# Patient Record
Sex: MALE | Race: WHITE | URBAN - NONMETROPOLITAN AREA
[De-identification: names, ages, dates, MRNs, and addresses within clinical notes are randomized per-mention and may not be internally consistent; named-entity substitution may affect disease eponyms.]

---

## 2017-07-18 ENCOUNTER — DOCTOR'S OFFICE (OUTPATIENT)
Dept: URBAN - NONMETROPOLITAN AREA CLINIC 1 | Facility: CLINIC | Age: 67
Setting detail: OPHTHALMOLOGY
End: 2017-07-18
Payer: COMMERCIAL

## 2017-07-18 DIAGNOSIS — H18.51: ICD-10-CM

## 2017-07-18 DIAGNOSIS — H25.013: ICD-10-CM

## 2017-07-18 PROCEDURE — 92014 COMPRE OPH EXAM EST PT 1/>: CPT | Performed by: OPHTHALMOLOGY

## 2017-07-18 ASSESSMENT — REFRACTION_MANIFEST
OU_VA: 20/
OS_VA2: 20/
OD_VA1: 20/
OS_VA1: 20/
OU_VA: 20/
OD_VA1: 20/
OS_VA1: 20/
OS_VA2: 20/
OS_VA3: 20/
OD_VA3: 20/
OS_VA3: 20/
OS_VA1: 20/
OD_VA2: 20/
OD_VA2: 20/
OD_VA1: 20/
OD_VA3: 20/
OS_VA2: 20/
OD_VA3: 20/
OS_VA3: 20/
OD_VA2: 20/
OU_VA: 20/

## 2017-07-18 ASSESSMENT — REFRACTION_AUTOREFRACTION
OS_CYLINDER: -0.50
OD_CYLINDER: -0.25
OD_SPHERE: +0.25
OS_AXIS: 179
OD_AXIS: 103
OS_SPHERE: +0.75

## 2017-07-18 ASSESSMENT — CORNEAL DYSTROPHY - POSTERIOR
OS_POSTERIORDYSTROPHY: GUTTATA
OD_POSTERIORDYSTROPHY: GUTTATA

## 2017-07-18 ASSESSMENT — REFRACTION_CURRENTRX
OS_OVR_VA: 20/
OD_OVR_VA: 20/
OS_OVR_VA: 20/
OD_OVR_VA: 20/
OD_OVR_VA: 20/
OS_OVR_VA: 20/

## 2017-07-18 ASSESSMENT — SPHEQUIV_DERIVED
OS_SPHEQUIV: 0.5
OD_SPHEQUIV: 0.125

## 2017-07-18 ASSESSMENT — CONFRONTATIONAL VISUAL FIELD TEST (CVF)
OS_FINDINGS: FULL
OD_FINDINGS: FULL

## 2017-07-18 ASSESSMENT — VISUAL ACUITY
OS_BCVA: 20/25
OD_BCVA: 20/40+2

## 2018-07-25 ENCOUNTER — DOCTOR'S OFFICE (OUTPATIENT)
Dept: URBAN - NONMETROPOLITAN AREA CLINIC 1 | Facility: CLINIC | Age: 68
Setting detail: OPHTHALMOLOGY
End: 2018-07-25
Payer: COMMERCIAL

## 2018-07-25 DIAGNOSIS — H25.013: ICD-10-CM

## 2018-07-25 DIAGNOSIS — H18.51: ICD-10-CM

## 2018-07-25 DIAGNOSIS — H43.391: ICD-10-CM

## 2018-07-25 PROCEDURE — 92014 COMPRE OPH EXAM EST PT 1/>: CPT | Performed by: OPHTHALMOLOGY

## 2018-07-25 ASSESSMENT — REFRACTION_MANIFEST
OS_VA1: 20/
OS_VA2: 20/
OS_VA2: 20/
OU_VA: 20/
OD_VA1: 20/
OD_VA1: 20/
OS_VA2: 20/
OS_VA1: 20/
OS_VA1: 20/
OD_VA2: 20/
OD_VA2: 20/
OS_VA3: 20/
OD_VA3: 20/
OS_VA3: 20/
OD_VA1: 20/
OD_VA3: 20/
OU_VA: 20/
OU_VA: 20/
OS_VA3: 20/
OD_VA2: 20/
OD_VA3: 20/

## 2018-07-25 ASSESSMENT — CORNEAL DYSTROPHY - POSTERIOR
OD_POSTERIORDYSTROPHY: GUTTATA
OS_POSTERIORDYSTROPHY: GUTTATA

## 2018-07-25 ASSESSMENT — PACHYMETRY
OS_CT_CORRECTION: 4
OD_CT_CORRECTION: 5
OS_CT_UM: 495
OD_CT_UM: 479

## 2018-07-25 ASSESSMENT — SPHEQUIV_DERIVED
OS_SPHEQUIV: 0.875
OD_SPHEQUIV: -0.25

## 2018-07-25 ASSESSMENT — REFRACTION_CURRENTRX
OD_OVR_VA: 20/
OS_OVR_VA: 20/
OS_OVR_VA: 20/
OD_OVR_VA: 20/
OS_OVR_VA: 20/
OD_OVR_VA: 20/

## 2018-07-25 ASSESSMENT — REFRACTION_AUTOREFRACTION
OD_CYLINDER: -0.50
OD_AXIS: 21
OD_SPHERE: 0.00
OS_SPHERE: +1.25
OS_CYLINDER: -0.75
OS_AXIS: 4

## 2018-07-25 ASSESSMENT — VISUAL ACUITY
OS_BCVA: 20/25-1
OD_BCVA: 20/40

## 2018-07-25 ASSESSMENT — CONFRONTATIONAL VISUAL FIELD TEST (CVF)
OD_FINDINGS: FULL
OS_FINDINGS: FULL

## 2019-07-31 ENCOUNTER — DOCTOR'S OFFICE (OUTPATIENT)
Dept: URBAN - NONMETROPOLITAN AREA CLINIC 1 | Facility: CLINIC | Age: 69
Setting detail: OPHTHALMOLOGY
End: 2019-07-31
Payer: COMMERCIAL

## 2019-07-31 DIAGNOSIS — H43.811: ICD-10-CM

## 2019-07-31 DIAGNOSIS — H25.013: ICD-10-CM

## 2019-07-31 DIAGNOSIS — H18.51: ICD-10-CM

## 2019-07-31 PROCEDURE — 92014 COMPRE OPH EXAM EST PT 1/>: CPT | Performed by: OPHTHALMOLOGY

## 2019-07-31 PROCEDURE — 92134 CPTRZ OPH DX IMG PST SGM RTA: CPT | Performed by: OPHTHALMOLOGY

## 2019-07-31 PROCEDURE — 92286 ANT SGM IMG I&R SPECLR MIC: CPT | Performed by: OPHTHALMOLOGY

## 2019-07-31 ASSESSMENT — REFRACTION_CURRENTRX
OD_OVR_VA: 20/
OS_OVR_VA: 20/
OS_OVR_VA: 20/
OS_SPHERE: +2.00
OD_SPHERE: +2.00
OD_OVR_VA: 20/
OS_OVR_VA: 20/
OD_OVR_VA: 20/

## 2019-07-31 ASSESSMENT — REFRACTION_MANIFEST
OS_VA2: 20/
OD_VA2: 20/
OS_VA2: 20/
OD_VA1: 20/
OU_VA: 20/
OD_VA1: 20/
OD_VA3: 20/
OD_VA2: 20/
OS_VA3: 20/
OD_VA3: 20/
OU_VA: 20/
OS_VA1: 20/
OS_VA1: 20/
OS_VA3: 20/

## 2019-07-31 ASSESSMENT — REFRACTION_AUTOREFRACTION
OS_AXIS: 1
OD_AXIS: 084
OD_SPHERE: +1.25
OD_CYLINDER: -0.50
OS_SPHERE: +1.25
OS_CYLINDER: -0.50

## 2019-07-31 ASSESSMENT — CONFRONTATIONAL VISUAL FIELD TEST (CVF)
OD_FINDINGS: FULL
OS_FINDINGS: FULL

## 2019-07-31 ASSESSMENT — SPHEQUIV_DERIVED
OS_SPHEQUIV: 1
OD_SPHEQUIV: 1

## 2019-07-31 ASSESSMENT — CORNEAL DYSTROPHY - POSTERIOR
OD_POSTERIORDYSTROPHY: GUTTATA
OS_POSTERIORDYSTROPHY: GUTTATA

## 2019-07-31 ASSESSMENT — VISUAL ACUITY
OD_BCVA: 20/40+1
OS_BCVA: 20/25

## 2020-07-29 ENCOUNTER — DOCTOR'S OFFICE (OUTPATIENT)
Dept: URBAN - NONMETROPOLITAN AREA CLINIC 1 | Facility: CLINIC | Age: 70
Setting detail: OPHTHALMOLOGY
End: 2020-07-29
Payer: COMMERCIAL

## 2020-07-29 VITALS — HEIGHT: 60 IN

## 2020-07-29 DIAGNOSIS — H43.811: ICD-10-CM

## 2020-07-29 DIAGNOSIS — C85.80: ICD-10-CM

## 2020-07-29 DIAGNOSIS — H18.51: ICD-10-CM

## 2020-07-29 DIAGNOSIS — H25.013: ICD-10-CM

## 2020-07-29 PROCEDURE — 92286 ANT SGM IMG I&R SPECLR MIC: CPT | Performed by: OPHTHALMOLOGY

## 2020-07-29 PROCEDURE — 92014 COMPRE OPH EXAM EST PT 1/>: CPT | Performed by: OPHTHALMOLOGY

## 2020-07-29 ASSESSMENT — REFRACTION_AUTOREFRACTION
OS_AXIS: 128
OD_CYLINDER: -1.00
OD_AXIS: 083
OS_CYLINDER: -0.50
OD_SPHERE: +0.75
OS_SPHERE: +1.25

## 2020-07-29 ASSESSMENT — VISUAL ACUITY
OD_BCVA: 20/40-1
OS_BCVA: 20/25+2

## 2020-07-29 ASSESSMENT — CONFRONTATIONAL VISUAL FIELD TEST (CVF)
OD_FINDINGS: FULL
OS_FINDINGS: FULL

## 2020-07-29 ASSESSMENT — REFRACTION_CURRENTRX
OS_OVR_VA: 20/
OD_SPHERE: +2.00
OD_OVR_VA: 20/
OS_SPHERE: +2.00

## 2020-07-29 ASSESSMENT — CORNEAL DYSTROPHY - POSTERIOR
OD_POSTERIORDYSTROPHY: GUTTATA
OS_POSTERIORDYSTROPHY: GUTTATA

## 2020-07-29 ASSESSMENT — SPHEQUIV_DERIVED
OS_SPHEQUIV: 1
OD_SPHEQUIV: 0.25

## 2021-08-03 ENCOUNTER — DOCTOR'S OFFICE (OUTPATIENT)
Dept: URBAN - NONMETROPOLITAN AREA CLINIC 1 | Facility: CLINIC | Age: 71
Setting detail: OPHTHALMOLOGY
End: 2021-08-03
Payer: COMMERCIAL

## 2021-08-03 VITALS — HEIGHT: 60 IN

## 2021-08-03 DIAGNOSIS — H43.811: ICD-10-CM

## 2021-08-03 DIAGNOSIS — C85.80: ICD-10-CM

## 2021-08-03 DIAGNOSIS — H18.513: ICD-10-CM

## 2021-08-03 DIAGNOSIS — H25.013: ICD-10-CM

## 2021-08-03 PROCEDURE — 92286 ANT SGM IMG I&R SPECLR MIC: CPT | Performed by: OPHTHALMOLOGY

## 2021-08-03 PROCEDURE — 92014 COMPRE OPH EXAM EST PT 1/>: CPT | Performed by: OPHTHALMOLOGY

## 2021-08-03 ASSESSMENT — REFRACTION_CURRENTRX
OD_SPHERE: +2.00
OS_SPHERE: +2.00
OD_OVR_VA: 20/
OS_OVR_VA: 20/

## 2021-08-03 ASSESSMENT — VISUAL ACUITY
OD_BCVA: 20/40+1
OS_BCVA: 20/20-1

## 2021-08-03 ASSESSMENT — REFRACTION_AUTOREFRACTION
OD_AXIS: 105
OS_CYLINDER: -0.25
OS_AXIS: 158
OD_SPHERE: +1.00
OS_SPHERE: +1.75
OD_CYLINDER: -0.75

## 2021-08-03 ASSESSMENT — SPHEQUIV_DERIVED
OS_SPHEQUIV: 1.625
OD_SPHEQUIV: 0.625

## 2021-08-03 ASSESSMENT — CONFRONTATIONAL VISUAL FIELD TEST (CVF)
OD_FINDINGS: FULL
OS_FINDINGS: FULL

## 2021-08-03 ASSESSMENT — CORNEAL DYSTROPHY - POSTERIOR
OD_POSTERIORDYSTROPHY: GUTTATA
OS_POSTERIORDYSTROPHY: GUTTATA

## 2022-04-19 ENCOUNTER — DOCTOR'S OFFICE (OUTPATIENT)
Dept: URBAN - NONMETROPOLITAN AREA CLINIC 1 | Facility: CLINIC | Age: 72
Setting detail: OPHTHALMOLOGY
End: 2022-04-19
Payer: COMMERCIAL

## 2022-04-19 DIAGNOSIS — H18.513: ICD-10-CM

## 2022-04-19 DIAGNOSIS — H25.043: ICD-10-CM

## 2022-04-19 DIAGNOSIS — H35.373: ICD-10-CM

## 2022-04-19 DIAGNOSIS — H43.811: ICD-10-CM

## 2022-04-19 DIAGNOSIS — H25.013: ICD-10-CM

## 2022-04-19 PROCEDURE — 76514 ECHO EXAM OF EYE THICKNESS: CPT | Performed by: OPHTHALMOLOGY

## 2022-04-19 PROCEDURE — 99214 OFFICE O/P EST MOD 30 MIN: CPT | Performed by: OPHTHALMOLOGY

## 2022-04-19 PROCEDURE — 92134 CPTRZ OPH DX IMG PST SGM RTA: CPT | Performed by: OPHTHALMOLOGY

## 2022-04-19 ASSESSMENT — PACHYMETRY
OD_CT_CORRECTION: 5
OD_CT_UM: 470
OS_CT_UM: 439
OS_CT_CORRECTION: >7

## 2022-04-19 ASSESSMENT — REFRACTION_CURRENTRX
OS_SPHERE: +2.00
OD_SPHERE: +2.00
OS_OVR_VA: 20/
OD_OVR_VA: 20/

## 2022-04-19 ASSESSMENT — VISUAL ACUITY
OD_BCVA: 20/40+2
OS_BCVA: 20/25+2

## 2022-04-19 ASSESSMENT — REFRACTION_AUTOREFRACTION
OD_SPHERE: +1.25
OS_SPHERE: +1.75
OD_CYLINDER: -1.00
OD_AXIS: 147
OS_AXIS: 075
OS_CYLINDER: -1.00

## 2022-04-19 ASSESSMENT — CORNEAL DYSTROPHY - POSTERIOR
OD_POSTERIORDYSTROPHY: GUTTATA
OS_POSTERIORDYSTROPHY: GUTTATA

## 2022-04-19 ASSESSMENT — SPHEQUIV_DERIVED
OS_SPHEQUIV: 1.25
OD_SPHEQUIV: 0.75

## 2022-04-19 ASSESSMENT — CONFRONTATIONAL VISUAL FIELD TEST (CVF)
OD_FINDINGS: FULL
OS_FINDINGS: FULL

## 2022-07-20 ENCOUNTER — DOCTOR'S OFFICE (OUTPATIENT)
Dept: URBAN - NONMETROPOLITAN AREA CLINIC 1 | Facility: CLINIC | Age: 72
Setting detail: OPHTHALMOLOGY
End: 2022-07-20
Payer: COMMERCIAL

## 2022-07-20 ENCOUNTER — RX ONLY (RX ONLY)
Age: 72
End: 2022-07-20

## 2022-07-20 DIAGNOSIS — H43.811: ICD-10-CM

## 2022-07-20 DIAGNOSIS — H16.221: ICD-10-CM

## 2022-07-20 DIAGNOSIS — H35.373: ICD-10-CM

## 2022-07-20 DIAGNOSIS — H25.013: ICD-10-CM

## 2022-07-20 DIAGNOSIS — H25.043: ICD-10-CM

## 2022-07-20 DIAGNOSIS — H16.222: ICD-10-CM

## 2022-07-20 PROBLEM — H18.513 ENDOTHELIAL CORNEAL DYSTROPHY; BOTH EYES: Status: ACTIVE | Noted: 2021-08-03

## 2022-07-20 PROBLEM — C85.80 NON HODGKINS LYMPHOMA: Status: ACTIVE | Noted: 2020-07-29

## 2022-07-20 PROCEDURE — 92134 CPTRZ OPH DX IMG PST SGM RTA: CPT | Performed by: OPHTHALMOLOGY

## 2022-07-20 PROCEDURE — 99214 OFFICE O/P EST MOD 30 MIN: CPT | Performed by: OPHTHALMOLOGY

## 2022-07-20 ASSESSMENT — REFRACTION_AUTOREFRACTION
OS_AXIS: 050
OD_AXIS: 094
OD_SPHERE: +1.75
OD_CYLINDER: -1.00
OS_CYLINDER: -0.50
OS_SPHERE: +2.25

## 2022-07-20 ASSESSMENT — REFRACTION_CURRENTRX
OD_OVR_VA: 20/
OS_OVR_VA: 20/
OS_SPHERE: +2.00
OD_SPHERE: +2.00

## 2022-07-20 ASSESSMENT — VISUAL ACUITY
OS_BCVA: 20/25+1
OD_BCVA: 20/60+2

## 2022-07-20 ASSESSMENT — CORNEAL DYSTROPHY - POSTERIOR
OS_POSTERIORDYSTROPHY: GUTTATA
OD_POSTERIORDYSTROPHY: GUTTATA

## 2022-07-20 ASSESSMENT — DRY EYES - PHYSICIAN NOTES
OS_GENERALCOMMENTS: MILD KSICCA
OD_GENERALCOMMENTS: MILD KSICCA

## 2022-07-20 ASSESSMENT — CONFRONTATIONAL VISUAL FIELD TEST (CVF)
OD_FINDINGS: FULL
OS_FINDINGS: FULL

## 2022-07-20 ASSESSMENT — SPHEQUIV_DERIVED
OS_SPHEQUIV: 2
OD_SPHEQUIV: 1.25

## 2022-12-14 ENCOUNTER — DOCTOR'S OFFICE (OUTPATIENT)
Dept: URBAN - NONMETROPOLITAN AREA CLINIC 1 | Facility: CLINIC | Age: 72
Setting detail: OPHTHALMOLOGY
End: 2022-12-14
Payer: COMMERCIAL

## 2022-12-14 ENCOUNTER — RX ONLY (RX ONLY)
Age: 72
End: 2022-12-14

## 2022-12-14 VITALS — HEIGHT: 60 IN

## 2022-12-14 DIAGNOSIS — H35.373: ICD-10-CM

## 2022-12-14 DIAGNOSIS — H25.013: ICD-10-CM

## 2022-12-14 DIAGNOSIS — H43.811: ICD-10-CM

## 2022-12-14 DIAGNOSIS — H25.043: ICD-10-CM

## 2022-12-14 DIAGNOSIS — H16.221: ICD-10-CM

## 2022-12-14 DIAGNOSIS — H16.222: ICD-10-CM

## 2022-12-14 PROCEDURE — 92134 CPTRZ OPH DX IMG PST SGM RTA: CPT | Performed by: OPHTHALMOLOGY

## 2022-12-14 PROCEDURE — 92014 COMPRE OPH EXAM EST PT 1/>: CPT | Performed by: OPHTHALMOLOGY

## 2022-12-14 ASSESSMENT — CORNEAL DYSTROPHY - POSTERIOR
OS_POSTERIORDYSTROPHY: GUTTATA
OD_POSTERIORDYSTROPHY: GUTTATA

## 2022-12-14 ASSESSMENT — REFRACTION_AUTOREFRACTION
OS_SPHERE: +2.75
OS_AXIS: 92
OS_CYLINDER: -1.25

## 2022-12-14 ASSESSMENT — REFRACTION_CURRENTRX
OS_OVR_VA: 20/
OS_SPHERE: +2.00
OD_SPHERE: +2.00
OD_OVR_VA: 20/

## 2022-12-14 ASSESSMENT — SPHEQUIV_DERIVED: OS_SPHEQUIV: 2.125

## 2022-12-14 ASSESSMENT — DRY EYES - PHYSICIAN NOTES
OD_GENERALCOMMENTS: MILD KSICCA
OS_GENERALCOMMENTS: MILD KSICCA

## 2022-12-14 ASSESSMENT — VISUAL ACUITY
OD_BCVA: 20/40-1
OS_BCVA: 20/30-2

## 2022-12-14 ASSESSMENT — CONFRONTATIONAL VISUAL FIELD TEST (CVF)
OS_FINDINGS: FULL
OD_FINDINGS: FULL

## 2023-04-19 ENCOUNTER — DOCTOR'S OFFICE (OUTPATIENT)
Dept: URBAN - NONMETROPOLITAN AREA CLINIC 1 | Facility: CLINIC | Age: 73
Setting detail: OPHTHALMOLOGY
End: 2023-04-19
Payer: COMMERCIAL

## 2023-04-19 DIAGNOSIS — H43.811: ICD-10-CM

## 2023-04-19 DIAGNOSIS — H25.043: ICD-10-CM

## 2023-04-19 DIAGNOSIS — H16.223: ICD-10-CM

## 2023-04-19 DIAGNOSIS — H35.3131: ICD-10-CM

## 2023-04-19 DIAGNOSIS — H25.013: ICD-10-CM

## 2023-04-19 PROCEDURE — 99213 OFFICE O/P EST LOW 20 MIN: CPT | Performed by: OPHTHALMOLOGY

## 2023-04-19 PROCEDURE — 92134 CPTRZ OPH DX IMG PST SGM RTA: CPT | Performed by: OPHTHALMOLOGY

## 2023-04-19 ASSESSMENT — REFRACTION_AUTOREFRACTION
OS_AXIS: 92
OS_SPHERE: +2.75
OS_CYLINDER: -1.25

## 2023-04-19 ASSESSMENT — REFRACTION_CURRENTRX
OS_OVR_VA: 20/
OD_OVR_VA: 20/
OD_SPHERE: +2.00
OS_SPHERE: +2.00

## 2023-04-19 ASSESSMENT — SPHEQUIV_DERIVED: OS_SPHEQUIV: 2.125

## 2023-04-19 ASSESSMENT — VISUAL ACUITY
OD_BCVA: 20/50
OS_BCVA: 20/25

## 2023-04-19 ASSESSMENT — CONFRONTATIONAL VISUAL FIELD TEST (CVF)
OD_FINDINGS: FULL
OS_FINDINGS: FULL

## 2023-04-19 ASSESSMENT — DRY EYES - PHYSICIAN NOTES
OS_GENERALCOMMENTS: MILD KSICCA
OD_GENERALCOMMENTS: MILD KSICCA

## 2023-04-19 ASSESSMENT — CORNEAL DYSTROPHY - POSTERIOR
OS_POSTERIORDYSTROPHY: GUTTATA
OD_POSTERIORDYSTROPHY: GUTTATA

## 2023-05-18 ENCOUNTER — AMBUL SURGICAL CARE (OUTPATIENT)
Dept: URBAN - NONMETROPOLITAN AREA SURGERY 1 | Facility: SURGERY | Age: 73
Setting detail: OPHTHALMOLOGY
End: 2023-05-18
Payer: COMMERCIAL

## 2023-05-18 DIAGNOSIS — H25.011: ICD-10-CM

## 2023-05-18 DIAGNOSIS — H25.11: ICD-10-CM

## 2023-05-18 DIAGNOSIS — H25.041: ICD-10-CM

## 2023-05-18 PROCEDURE — G8907 PT DOC NO EVENTS ON DISCHARG: HCPCS | Performed by: CLINIC/CENTER

## 2023-05-18 PROCEDURE — G8918 PT W/O PREOP ORDER IV AB PRO: HCPCS | Performed by: OPHTHALMOLOGY

## 2023-05-18 PROCEDURE — G8907 PT DOC NO EVENTS ON DISCHARG: HCPCS | Performed by: OPHTHALMOLOGY

## 2023-05-18 PROCEDURE — 66984 XCAPSL CTRC RMVL W/O ECP: CPT | Performed by: OPHTHALMOLOGY

## 2023-05-18 PROCEDURE — 66984 XCAPSL CTRC RMVL W/O ECP: CPT | Performed by: CLINIC/CENTER

## 2023-05-18 PROCEDURE — G8918 PT W/O PREOP ORDER IV AB PRO: HCPCS | Performed by: CLINIC/CENTER

## 2023-05-19 ENCOUNTER — RX ONLY (RX ONLY)
Age: 73
End: 2023-05-19

## 2023-05-19 ENCOUNTER — DOCTOR'S OFFICE (OUTPATIENT)
Dept: URBAN - NONMETROPOLITAN AREA CLINIC 1 | Facility: CLINIC | Age: 73
Setting detail: OPHTHALMOLOGY
End: 2023-05-19

## 2023-05-19 DIAGNOSIS — H25.042: ICD-10-CM

## 2023-05-19 DIAGNOSIS — H18.513: ICD-10-CM

## 2023-05-19 DIAGNOSIS — Z96.1: ICD-10-CM

## 2023-05-19 DIAGNOSIS — H25.012: ICD-10-CM

## 2023-05-19 PROCEDURE — 99024 POSTOP FOLLOW-UP VISIT: CPT | Performed by: OPTOMETRIST

## 2023-05-19 ASSESSMENT — REFRACTION_AUTOREFRACTION
OD_SPHERE: +1.00
OD_AXIS: 172
OS_AXIS: 17
OS_SPHERE: +1.50
OD_CYLINDER: -0.75
OS_CYLINDER: -0.50

## 2023-05-19 ASSESSMENT — PACHYMETRY
OD_CT_UM: 479
OS_CT_UM: 495
OS_CT_CORRECTION: 4
OD_CT_CORRECTION: 5

## 2023-05-19 ASSESSMENT — TONOMETRY: OD_IOP_MMHG: 21

## 2023-05-19 ASSESSMENT — SPHEQUIV_DERIVED
OD_SPHEQUIV: 0.625
OS_SPHEQUIV: 1.25

## 2023-05-19 ASSESSMENT — VISUAL ACUITY
OS_BCVA: 20/40-1
OD_BCVA: 20/30-2

## 2023-05-19 ASSESSMENT — REFRACTION_CURRENTRX
OD_OVR_VA: 20/
OS_SPHERE: +2.00
OS_OVR_VA: 20/
OD_SPHERE: +2.00

## 2023-05-19 ASSESSMENT — CORNEAL DYSTROPHY - POSTERIOR
OS_POSTERIORDYSTROPHY: GUTTATA
OD_POSTERIORDYSTROPHY: 2+ 3+ GUTTATA

## 2023-05-19 ASSESSMENT — DRY EYES - PHYSICIAN NOTES
OD_GENERALCOMMENTS: MILD KSICCA
OS_GENERALCOMMENTS: MILD KSICCA

## 2023-05-24 ENCOUNTER — DOCTOR'S OFFICE (OUTPATIENT)
Dept: URBAN - NONMETROPOLITAN AREA CLINIC 1 | Facility: CLINIC | Age: 73
Setting detail: OPHTHALMOLOGY
End: 2023-05-24
Payer: COMMERCIAL

## 2023-05-24 ENCOUNTER — RX ONLY (RX ONLY)
Age: 73
End: 2023-05-24

## 2023-05-24 DIAGNOSIS — Z96.1: ICD-10-CM

## 2023-05-24 DIAGNOSIS — H25.012: ICD-10-CM

## 2023-05-24 DIAGNOSIS — H25.042: ICD-10-CM

## 2023-05-24 DIAGNOSIS — H18.513: ICD-10-CM

## 2023-05-24 DIAGNOSIS — H25.12: ICD-10-CM

## 2023-05-24 PROCEDURE — 92136 OPHTHALMIC BIOMETRY: CPT | Performed by: OPHTHALMOLOGY

## 2023-05-24 PROCEDURE — 99024 POSTOP FOLLOW-UP VISIT: CPT | Performed by: OPHTHALMOLOGY

## 2023-05-24 ASSESSMENT — REFRACTION_AUTOREFRACTION
OD_CYLINDER: -1.75
OS_SPHERE: +1.25
OD_AXIS: 088
OS_AXIS: 074
OD_SPHERE: +1.00
OS_CYLINDER: -0.25

## 2023-05-24 ASSESSMENT — REFRACTION_CURRENTRX
OS_SPHERE: +2.00
OD_SPHERE: +2.00
OS_OVR_VA: 20/
OD_OVR_VA: 20/

## 2023-05-24 ASSESSMENT — PACHYMETRY
OD_CT_CORRECTION: 5
OD_CT_UM: 479
OS_CT_UM: 495
OS_CT_CORRECTION: 4

## 2023-05-24 ASSESSMENT — CORNEAL DYSTROPHY - POSTERIOR
OD_POSTERIORDYSTROPHY: 3+
OS_POSTERIORDYSTROPHY: GUTTATA

## 2023-05-24 ASSESSMENT — TONOMETRY: OD_IOP_MMHG: 16

## 2023-05-24 ASSESSMENT — DRY EYES - PHYSICIAN NOTES: OS_GENERALCOMMENTS: MILD KSICCA

## 2023-05-24 ASSESSMENT — SPHEQUIV_DERIVED
OD_SPHEQUIV: 0.125
OS_SPHEQUIV: 1.125

## 2023-05-24 ASSESSMENT — VISUAL ACUITY
OD_BCVA: 20/40-1
OS_BCVA: 20/20-1

## 2023-05-25 ENCOUNTER — AMBUL SURGICAL CARE (OUTPATIENT)
Dept: URBAN - NONMETROPOLITAN AREA SURGERY 1 | Facility: SURGERY | Age: 73
Setting detail: OPHTHALMOLOGY
End: 2023-05-25
Payer: COMMERCIAL

## 2023-05-25 DIAGNOSIS — H25.042: ICD-10-CM

## 2023-05-25 DIAGNOSIS — H25.012: ICD-10-CM

## 2023-05-25 DIAGNOSIS — H25.12: ICD-10-CM

## 2023-05-25 PROCEDURE — G8918 PT W/O PREOP ORDER IV AB PRO: HCPCS | Performed by: CLINIC/CENTER

## 2023-05-25 PROCEDURE — G8907 PT DOC NO EVENTS ON DISCHARG: HCPCS | Performed by: OPHTHALMOLOGY

## 2023-05-25 PROCEDURE — G8918 PT W/O PREOP ORDER IV AB PRO: HCPCS | Performed by: OPHTHALMOLOGY

## 2023-05-25 PROCEDURE — G8907 PT DOC NO EVENTS ON DISCHARG: HCPCS | Performed by: CLINIC/CENTER

## 2023-05-25 PROCEDURE — 66984 XCAPSL CTRC RMVL W/O ECP: CPT | Performed by: CLINIC/CENTER

## 2023-05-25 PROCEDURE — 66984 XCAPSL CTRC RMVL W/O ECP: CPT | Performed by: OPHTHALMOLOGY

## 2023-05-26 ENCOUNTER — DOCTOR'S OFFICE (OUTPATIENT)
Dept: URBAN - NONMETROPOLITAN AREA CLINIC 1 | Facility: CLINIC | Age: 73
Setting detail: OPHTHALMOLOGY
End: 2023-05-26
Payer: COMMERCIAL

## 2023-05-26 DIAGNOSIS — Z96.1: ICD-10-CM

## 2023-05-26 PROBLEM — H25.042 PSC POLAR AGE RELATED CATARACT; LEFT EYE: Status: RESOLVED | Noted: 2023-05-19 | Resolved: 2023-05-26

## 2023-05-26 PROCEDURE — 99024 POSTOP FOLLOW-UP VISIT: CPT | Performed by: OPHTHALMOLOGY

## 2023-05-26 ASSESSMENT — REFRACTION_AUTOREFRACTION
OD_AXIS: 114
OS_CYLINDER: -2.25
OD_CYLINDER: -0.25
OD_SPHERE: +0.25
OS_AXIS: 13
OS_SPHERE: +1.00

## 2023-05-26 ASSESSMENT — REFRACTION_CURRENTRX
OD_SPHERE: +2.00
OD_OVR_VA: 20/
OS_OVR_VA: 20/
OS_SPHERE: +2.00

## 2023-05-26 ASSESSMENT — PACHYMETRY
OD_CT_UM: 479
OS_CT_UM: 495
OD_CT_CORRECTION: 5
OS_CT_CORRECTION: 4

## 2023-05-26 ASSESSMENT — DRY EYES - PHYSICIAN NOTES: OS_GENERALCOMMENTS: MILD KSICCA

## 2023-05-26 ASSESSMENT — VISUAL ACUITY
OD_BCVA: 20/40+2
OS_BCVA: 20/20-1

## 2023-05-26 ASSESSMENT — CORNEAL DYSTROPHY - POSTERIOR
OS_POSTERIORDYSTROPHY: GUTTATA
OD_POSTERIORDYSTROPHY: 3+

## 2023-05-26 ASSESSMENT — SPHEQUIV_DERIVED
OD_SPHEQUIV: 0.125
OS_SPHEQUIV: -0.125

## 2023-05-26 ASSESSMENT — TONOMETRY: OD_IOP_MMHG: 19

## 2023-06-09 ENCOUNTER — DOCTOR'S OFFICE (OUTPATIENT)
Dept: URBAN - NONMETROPOLITAN AREA CLINIC 1 | Facility: CLINIC | Age: 73
Setting detail: OPHTHALMOLOGY
End: 2023-06-09

## 2023-06-09 DIAGNOSIS — Z96.1: ICD-10-CM

## 2023-06-09 PROCEDURE — 99024 POSTOP FOLLOW-UP VISIT: CPT | Performed by: OPTOMETRIST

## 2023-06-09 ASSESSMENT — REFRACTION_AUTOREFRACTION
OS_AXIS: 014
OD_AXIS: 079
OD_CYLINDER: -0.75
OS_CYLINDER: -0.75
OS_SPHERE: +0.75
OD_SPHERE: +0.75

## 2023-06-09 ASSESSMENT — DRY EYES - PHYSICIAN NOTES: OS_GENERALCOMMENTS: MILD KSICCA

## 2023-06-09 ASSESSMENT — REFRACTION_CURRENTRX
OS_OVR_VA: 20/
OS_SPHERE: +2.00
OD_SPHERE: +2.00
OD_OVR_VA: 20/

## 2023-06-09 ASSESSMENT — PACHYMETRY
OS_CT_UM: 495
OD_CT_UM: 479
OD_CT_CORRECTION: 5
OS_CT_CORRECTION: 4

## 2023-06-09 ASSESSMENT — SPHEQUIV_DERIVED
OS_SPHEQUIV: 0.375
OD_SPHEQUIV: 0.375

## 2023-06-09 ASSESSMENT — CORNEAL DYSTROPHY - POSTERIOR
OD_POSTERIORDYSTROPHY: GUTTATA
OS_POSTERIORDYSTROPHY: GUTTATA

## 2023-06-09 ASSESSMENT — TONOMETRY
OD_IOP_MMHG: 14
OS_IOP_MMHG: 15

## 2023-06-09 ASSESSMENT — VISUAL ACUITY
OD_BCVA: 20/25-1
OS_BCVA: 20/25+1

## 2023-06-23 ENCOUNTER — DOCTOR'S OFFICE (OUTPATIENT)
Dept: URBAN - NONMETROPOLITAN AREA CLINIC 1 | Facility: CLINIC | Age: 73
Setting detail: OPHTHALMOLOGY
End: 2023-06-23
Payer: COMMERCIAL

## 2023-06-23 DIAGNOSIS — Z96.1: ICD-10-CM

## 2023-06-23 PROCEDURE — 99024 POSTOP FOLLOW-UP VISIT: CPT | Performed by: OPHTHALMOLOGY

## 2023-06-23 ASSESSMENT — REFRACTION_CURRENTRX
OD_OVR_VA: 20/
OD_SPHERE: +2.00
OS_SPHERE: +2.00
OS_OVR_VA: 20/

## 2023-06-23 ASSESSMENT — REFRACTION_AUTOREFRACTION
OS_SPHERE: +0.75
OS_AXIS: 020
OD_SPHERE: +0.75
OD_AXIS: 087
OS_CYLINDER: -1.00
OD_CYLINDER: -0.50

## 2023-06-23 ASSESSMENT — CORNEAL DYSTROPHY - POSTERIOR
OD_POSTERIORDYSTROPHY: GUTTATA
OS_POSTERIORDYSTROPHY: GUTTATA

## 2023-06-23 ASSESSMENT — SPHEQUIV_DERIVED
OD_SPHEQUIV: 0.5
OS_SPHEQUIV: 0.25

## 2023-06-23 ASSESSMENT — VISUAL ACUITY
OD_BCVA: 20/25+2
OS_BCVA: 20/20-1

## 2023-06-23 ASSESSMENT — TONOMETRY
OS_IOP_MMHG: 19
OD_IOP_MMHG: 17

## 2023-06-23 ASSESSMENT — DRY EYES - PHYSICIAN NOTES: OS_GENERALCOMMENTS: MILD KSICCA

## 2023-06-23 ASSESSMENT — PACHYMETRY
OS_CT_UM: 495
OD_CT_CORRECTION: 5
OD_CT_UM: 479
OS_CT_CORRECTION: 4

## 2023-07-10 ENCOUNTER — DOCTOR'S OFFICE (OUTPATIENT)
Dept: URBAN - NONMETROPOLITAN AREA CLINIC 1 | Facility: CLINIC | Age: 73
Setting detail: OPHTHALMOLOGY
End: 2023-07-10
Payer: COMMERCIAL

## 2023-07-10 DIAGNOSIS — H43.811: ICD-10-CM

## 2023-07-10 DIAGNOSIS — H35.3131: ICD-10-CM

## 2023-07-10 PROCEDURE — 92134 CPTRZ OPH DX IMG PST SGM RTA: CPT | Performed by: OPHTHALMOLOGY

## 2023-07-10 PROCEDURE — 99214 OFFICE O/P EST MOD 30 MIN: CPT | Performed by: OPHTHALMOLOGY

## 2023-07-10 ASSESSMENT — REFRACTION_CURRENTRX
OS_SPHERE: +2.00
OD_OVR_VA: 20/
OD_SPHERE: +2.00
OS_OVR_VA: 20/
OD_OVR_VA: 20/
OS_OVR_VA: 20/

## 2023-07-10 ASSESSMENT — VISUAL ACUITY
OS_BCVA: 20/20-1
OD_BCVA: 20/25+1

## 2023-07-10 ASSESSMENT — SPHEQUIV_DERIVED
OD_SPHEQUIV: 0.5
OS_SPHEQUIV: 0.25

## 2023-07-10 ASSESSMENT — REFRACTION_AUTOREFRACTION
OS_CYLINDER: -1.00
OD_AXIS: 087
OD_CYLINDER: -0.50
OD_SPHERE: +0.75
OS_AXIS: 020
OS_SPHERE: +0.75

## 2023-07-10 ASSESSMENT — CORNEAL DYSTROPHY - POSTERIOR
OS_POSTERIORDYSTROPHY: GUTTATA
OD_POSTERIORDYSTROPHY: GUTTATA

## 2023-07-10 ASSESSMENT — CONFRONTATIONAL VISUAL FIELD TEST (CVF)
OS_FINDINGS: FULL
OD_FINDINGS: FULL

## 2023-07-10 ASSESSMENT — DRY EYES - PHYSICIAN NOTES: OS_GENERALCOMMENTS: MILD KSICCA

## 2023-07-21 ENCOUNTER — DOCTOR'S OFFICE (OUTPATIENT)
Dept: URBAN - NONMETROPOLITAN AREA CLINIC 1 | Facility: CLINIC | Age: 73
Setting detail: OPHTHALMOLOGY
End: 2023-07-21
Payer: COMMERCIAL

## 2023-07-21 DIAGNOSIS — H35.373: ICD-10-CM

## 2023-07-21 DIAGNOSIS — H43.813: ICD-10-CM

## 2023-07-21 DIAGNOSIS — Z96.1: ICD-10-CM

## 2023-07-21 PROBLEM — H16.221: Status: ACTIVE | Noted: 2023-07-10

## 2023-07-21 PROBLEM — H16.222: Status: ACTIVE | Noted: 2023-07-10

## 2023-07-21 PROCEDURE — 99024 POSTOP FOLLOW-UP VISIT: CPT | Performed by: OPHTHALMOLOGY

## 2023-07-21 PROCEDURE — 92250 FUNDUS PHOTOGRAPHY W/I&R: CPT | Performed by: OPHTHALMOLOGY

## 2023-07-21 PROCEDURE — 92235 FLUORESCEIN ANGRPH MLTIFRAME: CPT | Performed by: OPHTHALMOLOGY

## 2023-07-21 ASSESSMENT — REFRACTION_CURRENTRX
OS_SPHERE: +2.00
OD_OVR_VA: 20/
OD_OVR_VA: 20/
OD_SPHERE: +2.00
OS_OVR_VA: 20/
OS_OVR_VA: 20/

## 2023-07-21 ASSESSMENT — REFRACTION_AUTOREFRACTION
OD_SPHERE: +0.75
OD_AXIS: 087
OD_CYLINDER: -0.50
OS_AXIS: 020
OS_SPHERE: +0.75
OS_CYLINDER: -1.00

## 2023-07-21 ASSESSMENT — CORNEAL DYSTROPHY - POSTERIOR
OD_POSTERIORDYSTROPHY: GUTTATA
OS_POSTERIORDYSTROPHY: GUTTATA

## 2023-07-21 ASSESSMENT — CONFRONTATIONAL VISUAL FIELD TEST (CVF)
OD_FINDINGS: FULL
OS_FINDINGS: FULL

## 2023-07-21 ASSESSMENT — SPHEQUIV_DERIVED
OS_SPHEQUIV: 0.25
OD_SPHEQUIV: 0.5

## 2023-07-21 ASSESSMENT — VISUAL ACUITY
OS_BCVA: 20/20-1
OD_BCVA: 20/25+1

## 2023-07-21 ASSESSMENT — DRY EYES - PHYSICIAN NOTES: OS_GENERALCOMMENTS: MILD KSICCA

## 2023-09-21 ENCOUNTER — DOCTOR'S OFFICE (OUTPATIENT)
Dept: URBAN - NONMETROPOLITAN AREA CLINIC 1 | Facility: CLINIC | Age: 73
Setting detail: OPHTHALMOLOGY
End: 2023-09-21
Payer: COMMERCIAL

## 2023-09-21 DIAGNOSIS — H35.373: ICD-10-CM

## 2023-09-21 DIAGNOSIS — H43.813: ICD-10-CM

## 2023-09-21 PROCEDURE — 99214 OFFICE O/P EST MOD 30 MIN: CPT | Performed by: OPHTHALMOLOGY

## 2023-09-21 PROCEDURE — 92134 CPTRZ OPH DX IMG PST SGM RTA: CPT | Performed by: OPHTHALMOLOGY

## 2023-09-21 ASSESSMENT — CONFRONTATIONAL VISUAL FIELD TEST (CVF)
OS_FINDINGS: FULL
OD_FINDINGS: FULL

## 2023-09-21 ASSESSMENT — CORNEAL DYSTROPHY - POSTERIOR
OS_POSTERIORDYSTROPHY: GUTTATA
OD_POSTERIORDYSTROPHY: GUTTATA

## 2023-09-21 ASSESSMENT — DRY EYES - PHYSICIAN NOTES: OS_GENERALCOMMENTS: MILD KSICCA

## 2023-09-21 ASSESSMENT — VISUAL ACUITY
OD_BCVA: 20/25-2
OS_BCVA: 20/20-2

## 2023-09-21 ASSESSMENT — SPHEQUIV_DERIVED
OS_SPHEQUIV: 0.25
OD_SPHEQUIV: 0.5

## 2023-09-21 ASSESSMENT — REFRACTION_AUTOREFRACTION
OD_SPHERE: +0.75
OS_CYLINDER: -1.00
OD_AXIS: 087
OD_CYLINDER: -0.50
OS_SPHERE: +0.75
OS_AXIS: 020

## 2023-09-21 ASSESSMENT — REFRACTION_CURRENTRX
OD_SPHERE: +2.00
OS_SPHERE: +2.00
OD_OVR_VA: 20/
OS_OVR_VA: 20/
OD_OVR_VA: 20/
OS_OVR_VA: 20/

## 2023-11-07 ENCOUNTER — HOSPITAL ENCOUNTER (EMERGENCY)
Facility: HOSPITAL | Age: 73
Discharge: HOME | End: 2023-11-07
Attending: EMERGENCY MEDICINE
Payer: MEDICARE

## 2023-11-07 ENCOUNTER — APPOINTMENT (EMERGENCY)
Dept: RADIOLOGY | Facility: HOSPITAL | Age: 73
End: 2023-11-07
Payer: MEDICARE

## 2023-11-07 VITALS
SYSTOLIC BLOOD PRESSURE: 100 MMHG | DIASTOLIC BLOOD PRESSURE: 73 MMHG | TEMPERATURE: 97.1 F | HEIGHT: 67 IN | WEIGHT: 213 LBS | HEART RATE: 87 BPM | BODY MASS INDEX: 33.43 KG/M2 | OXYGEN SATURATION: 97 % | RESPIRATION RATE: 22 BRPM

## 2023-11-07 DIAGNOSIS — I48.91 ATRIAL FIBRILLATION WITH RAPID VENTRICULAR RESPONSE (HCC): ICD-10-CM

## 2023-11-07 DIAGNOSIS — R53.83 FATIGUE: ICD-10-CM

## 2023-11-07 DIAGNOSIS — T50.Z95A ADVERSE EFFECT OF VACCINE, INITIAL ENCOUNTER: ICD-10-CM

## 2023-11-07 DIAGNOSIS — J40 BRONCHITIS: Primary | ICD-10-CM

## 2023-11-07 LAB
ALBUMIN SERPL BCP-MCNC: 3.9 G/DL (ref 3.5–5)
ALP SERPL-CCNC: 55 U/L (ref 34–104)
ALT SERPL W P-5'-P-CCNC: 29 U/L (ref 7–52)
ANION GAP SERPL CALCULATED.3IONS-SCNC: 8 MMOL/L
AST SERPL W P-5'-P-CCNC: 26 U/L (ref 13–39)
BASOPHILS # BLD AUTO: 0.02 THOUSANDS/ÂΜL (ref 0–0.1)
BASOPHILS NFR BLD AUTO: 0 % (ref 0–1)
BILIRUB SERPL-MCNC: 0.43 MG/DL (ref 0.2–1)
BNP SERPL-MCNC: 239 PG/ML (ref 0–100)
BUN SERPL-MCNC: 18 MG/DL (ref 5–25)
CALCIUM SERPL-MCNC: 9 MG/DL (ref 8.4–10.2)
CARDIAC TROPONIN I PNL SERPL HS: 5 NG/L
CHLORIDE SERPL-SCNC: 108 MMOL/L (ref 96–108)
CO2 SERPL-SCNC: 23 MMOL/L (ref 21–32)
CREAT SERPL-MCNC: 1.01 MG/DL (ref 0.6–1.3)
EOSINOPHIL # BLD AUTO: 0.04 THOUSAND/ÂΜL (ref 0–0.61)
EOSINOPHIL NFR BLD AUTO: 1 % (ref 0–6)
ERYTHROCYTE [DISTWIDTH] IN BLOOD BY AUTOMATED COUNT: 12.8 % (ref 11.6–15.1)
FLUAV RNA RESP QL NAA+PROBE: NEGATIVE
FLUBV RNA RESP QL NAA+PROBE: NEGATIVE
GFR SERPL CREATININE-BSD FRML MDRD: 72 ML/MIN/1.73SQ M
GLUCOSE SERPL-MCNC: 105 MG/DL (ref 65–140)
HCT VFR BLD AUTO: 47.3 % (ref 36.5–49.3)
HGB BLD-MCNC: 16 G/DL (ref 12–17)
IMM GRANULOCYTES # BLD AUTO: 0.01 THOUSAND/UL (ref 0–0.2)
IMM GRANULOCYTES NFR BLD AUTO: 0 % (ref 0–2)
LYMPHOCYTES # BLD AUTO: 1.42 THOUSANDS/ÂΜL (ref 0.6–4.47)
LYMPHOCYTES NFR BLD AUTO: 28 % (ref 14–44)
MCH RBC QN AUTO: 31.7 PG (ref 26.8–34.3)
MCHC RBC AUTO-ENTMCNC: 33.8 G/DL (ref 31.4–37.4)
MCV RBC AUTO: 94 FL (ref 82–98)
MONOCYTES # BLD AUTO: 0.46 THOUSAND/ÂΜL (ref 0.17–1.22)
MONOCYTES NFR BLD AUTO: 9 % (ref 4–12)
NEUTROPHILS # BLD AUTO: 3.09 THOUSANDS/ÂΜL (ref 1.85–7.62)
NEUTS SEG NFR BLD AUTO: 62 % (ref 43–75)
NRBC BLD AUTO-RTO: 0 /100 WBCS
PLATELET # BLD AUTO: 178 THOUSANDS/UL (ref 149–390)
PMV BLD AUTO: 10.6 FL (ref 8.9–12.7)
POTASSIUM SERPL-SCNC: 4.2 MMOL/L (ref 3.5–5.3)
PROT SERPL-MCNC: 6.3 G/DL (ref 6.4–8.4)
QRS AXIS: -49 DEGREES
QRSD INTERVAL: 156 MS
QT INTERVAL: 386 MS
QTC INTERVAL: 477 MS
RBC # BLD AUTO: 5.05 MILLION/UL (ref 3.88–5.62)
RSV RNA RESP QL NAA+PROBE: NEGATIVE
SARS-COV-2 RNA RESP QL NAA+PROBE: NEGATIVE
SODIUM SERPL-SCNC: 139 MMOL/L (ref 135–147)
T WAVE AXIS: -12 DEGREES
VENTRICULAR RATE: 92 BPM
WBC # BLD AUTO: 5.04 THOUSAND/UL (ref 4.31–10.16)

## 2023-11-07 PROCEDURE — 83880 ASSAY OF NATRIURETIC PEPTIDE: CPT | Performed by: EMERGENCY MEDICINE

## 2023-11-07 PROCEDURE — 93010 ELECTROCARDIOGRAM REPORT: CPT | Performed by: INTERNAL MEDICINE

## 2023-11-07 PROCEDURE — 85025 COMPLETE CBC W/AUTO DIFF WBC: CPT | Performed by: EMERGENCY MEDICINE

## 2023-11-07 PROCEDURE — 80053 COMPREHEN METABOLIC PANEL: CPT | Performed by: EMERGENCY MEDICINE

## 2023-11-07 PROCEDURE — 96374 THER/PROPH/DIAG INJ IV PUSH: CPT

## 2023-11-07 PROCEDURE — 36415 COLL VENOUS BLD VENIPUNCTURE: CPT | Performed by: EMERGENCY MEDICINE

## 2023-11-07 PROCEDURE — 71046 X-RAY EXAM CHEST 2 VIEWS: CPT

## 2023-11-07 PROCEDURE — 99284 EMERGENCY DEPT VISIT MOD MDM: CPT | Performed by: EMERGENCY MEDICINE

## 2023-11-07 PROCEDURE — 93005 ELECTROCARDIOGRAM TRACING: CPT

## 2023-11-07 PROCEDURE — 0241U HB NFCT DS VIR RESP RNA 4 TRGT: CPT | Performed by: EMERGENCY MEDICINE

## 2023-11-07 PROCEDURE — 99283 EMERGENCY DEPT VISIT LOW MDM: CPT

## 2023-11-07 PROCEDURE — 84484 ASSAY OF TROPONIN QUANT: CPT | Performed by: EMERGENCY MEDICINE

## 2023-11-07 RX ORDER — FINASTERIDE 5 MG/1
TABLET, FILM COATED ORAL
COMMUNITY

## 2023-11-07 RX ORDER — MELOXICAM 15 MG/1
1 TABLET ORAL
COMMUNITY
End: 2023-11-07

## 2023-11-07 RX ORDER — ASPIRIN 81 MG
TABLET, DELAYED RELEASE (ENTERIC COATED) ORAL
COMMUNITY

## 2023-11-07 RX ORDER — METOPROLOL SUCCINATE 50 MG/1
TABLET, EXTENDED RELEASE ORAL
COMMUNITY

## 2023-11-07 RX ORDER — LISINOPRIL 5 MG/1
TABLET ORAL
COMMUNITY

## 2023-11-07 RX ORDER — TAMSULOSIN HYDROCHLORIDE 0.4 MG/1
CAPSULE ORAL
COMMUNITY

## 2023-11-07 RX ORDER — METOPROLOL TARTRATE 5 MG/5ML
10 INJECTION INTRAVENOUS ONCE
Status: COMPLETED | OUTPATIENT
Start: 2023-11-07 | End: 2023-11-07

## 2023-11-07 RX ORDER — ROSUVASTATIN CALCIUM 20 MG/1
TABLET, COATED ORAL
COMMUNITY

## 2023-11-07 RX ORDER — SPIRONOLACTONE 25 MG/1
25 TABLET ORAL DAILY
COMMUNITY

## 2023-11-07 RX ORDER — AZITHROMYCIN 250 MG/1
TABLET, FILM COATED ORAL
Qty: 6 TABLET | Refills: 0 | Status: SHIPPED | OUTPATIENT
Start: 2023-11-07 | End: 2023-11-11

## 2023-11-07 RX ORDER — LEVOTHYROXINE SODIUM 0.07 MG/1
TABLET ORAL
COMMUNITY

## 2023-11-07 RX ADMIN — METOROPROLOL TARTRATE 10 MG: 5 INJECTION, SOLUTION INTRAVENOUS at 13:59

## 2023-11-07 NOTE — ED PROVIDER NOTES
History  Chief Complaint   Patient presents with    Cough     Cough and congestion worried due to recent cardiac cath on Oct 25     Shortness of Breath     Mild at times with exertion with new respiratory systems       History provided by:  Medical records and patient  Cough  Cough characteristics:  Non-productive  Sputum characteristics:  Nondescript  Severity:  Moderate  Onset quality:  Gradual  Duration:  8 days  Timing:  Intermittent  Progression:  Improving  Chronicity:  New  Context comment:  Patient had a flu shot 8 days ago, shortly after developed URI symptoms with significant cough. Has a history of cardiomyopathy. Recent cath clean. Relieved by:  Nothing  Worsened by:  Nothing  Ineffective treatments:  None tried  Associated symptoms: no chills, no eye discharge, no rhinorrhea and no sinus congestion        Prior to Admission Medications   Prescriptions Last Dose Informant Patient Reported? Taking?    Docusate Sodium 100 MG capsule   Yes No   Sig: Docusate Sodium Oral     1 bid    active   Empagliflozin (Jardiance) 25 MG TABS   Yes Yes   Sig: Take by mouth every morning   apixaban (ELIQUIS) 5 mg   Yes Yes   Sig: Apixaban Oral  PO 1.0  BID    active   finasteride (PROSCAR) 5 mg tablet   Yes No   Sig: Finasteride Oral (Proscar)     1 a day   inactive   levothyroxine 75 mcg tablet   Yes No   Sig: Levothyroxine Oral    QD    inactive   lisinopril (ZESTRIL) 5 mg tablet   Yes No   Sig: Lisinopril Oral     once daily   active   metoprolol succinate (TOPROL-XL) 50 mg 24 hr tablet   Yes No   Sig: Metoprolol Oral 24 hr Tab (Succinate)    mg BID    active   rosuvastatin (CRESTOR) 20 MG tablet   Yes No   Sig: Rosuvastatin Calcium Oral     1 daily  in the am   active   spironolactone (ALDACTONE) 25 mg tablet   Yes Yes   Sig: Take 25 mg by mouth daily   tamsulosin (FLOMAX) 0.4 mg   Yes No   Sig: Tamsulosin Oral     1 x a day half hr after supper   inactive      Facility-Administered Medications: None       Past Medical History:   Diagnosis Date    Cardiac angina (HCC)     EF of 25%    Coronary artery disease     Hypertension        History reviewed. No pertinent surgical history. History reviewed. No pertinent family history. I have reviewed and agree with the history as documented. E-Cigarette/Vaping    E-Cigarette Use Never User      E-Cigarette/Vaping Substances     Social History     Tobacco Use    Smoking status: Never     Passive exposure: Never    Smokeless tobacco: Never   Vaping Use    Vaping Use: Never used   Substance Use Topics    Alcohol use: Not Currently    Drug use: Not Currently       Review of Systems   Constitutional:  Negative for appetite change, chills and fatigue. HENT:  Negative for rhinorrhea and trouble swallowing. Eyes:  Negative for pain, discharge and visual disturbance. Respiratory:  Positive for cough. Cardiovascular:  Negative for palpitations. Gastrointestinal:  Negative for nausea. Endocrine: Negative for polydipsia, polyphagia and polyuria. Genitourinary:  Negative for difficulty urinating, dysuria, hematuria and testicular pain. Musculoskeletal:  Negative for arthralgias and back pain. Skin:  Negative for color change. Allergic/Immunologic: Negative for immunocompromised state. Neurological:  Negative for dizziness, seizures, syncope and weakness. Hematological:  Negative for adenopathy. Psychiatric/Behavioral:  Negative for confusion and dysphoric mood. All other systems reviewed and are negative. Physical Exam  Physical Exam  Vitals and nursing note reviewed. Constitutional:       General: He is not in acute distress. Appearance: Normal appearance. He is not ill-appearing, toxic-appearing or diaphoretic. HENT:      Head: Normocephalic and atraumatic. Nose: Nose normal. No congestion or rhinorrhea. Mouth/Throat:      Mouth: Mucous membranes are moist.      Pharynx: Oropharynx is clear.  No oropharyngeal exudate or posterior oropharyngeal erythema. Eyes:      General:         Right eye: No discharge. Left eye: No discharge. Cardiovascular:      Rate and Rhythm: Tachycardia present. Rhythm irregular. Pulses: Normal pulses. Heart sounds: Normal heart sounds. No murmur heard. No gallop. Comments: 110 bpm  Pulmonary:      Effort: Pulmonary effort is normal. No respiratory distress. Breath sounds: Normal breath sounds. No stridor. No wheezing, rhonchi or rales. Chest:      Chest wall: No tenderness. Abdominal:      General: Bowel sounds are normal. There is no distension. Palpations: Abdomen is soft. There is no mass. Tenderness: There is no abdominal tenderness. There is no right CVA tenderness, left CVA tenderness, guarding or rebound. Hernia: No hernia is present. Musculoskeletal:         General: Normal range of motion. Cervical back: Normal range of motion and neck supple. Skin:     General: Skin is warm and dry. Capillary Refill: Capillary refill takes less than 2 seconds. Neurological:      General: No focal deficit present. Mental Status: He is alert and oriented to person, place, and time. Cranial Nerves: No cranial nerve deficit. Sensory: No sensory deficit. Motor: No weakness. Coordination: Coordination normal.      Gait: Gait normal.      Deep Tendon Reflexes: Reflexes normal.   Psychiatric:         Mood and Affect: Mood normal.         Behavior: Behavior normal.         Thought Content:  Thought content normal.         Judgment: Judgment normal.         Vital Signs  ED Triage Vitals   Temperature Pulse Respirations Blood Pressure SpO2   11/07/23 1229 11/07/23 1229 11/07/23 1229 11/07/23 1345 11/07/23 1229   (!) 97.1 °F (36.2 °C) 74 17 106/67 98 %      Temp Source Heart Rate Source Patient Position - Orthostatic VS BP Location FiO2 (%)   11/07/23 1229 11/07/23 1229 -- 11/07/23 1229 --   Temporal Monitor  Left arm       Pain Score 11/07/23 1229       No Pain           Vitals:    11/07/23 1400 11/07/23 1415 11/07/23 1430 11/07/23 1445   BP: 110/76 115/77 108/67 100/73   Pulse: (!) 108 88 88 87         Visual Acuity      ED Medications  Medications   metoprolol (LOPRESSOR) injection 10 mg (10 mg Intravenous Given 11/7/23 1359)       Diagnostic Studies  Results Reviewed       Procedure Component Value Units Date/Time    FLU/RSV/COVID - if FLU/RSV clinically relevant [[id number]]  (Normal) Collected: 11/07/23 1337    Lab Status: Final result Specimen: Nares from Nose Updated: 11/07/23 1439     SARS-CoV-2 Negative     INFLUENZA A PCR Negative     INFLUENZA B PCR Negative     RSV PCR Negative    Narrative:      FOR PEDIATRIC PATIENTS - copy/paste COVID Guidelines URL to browser: [email or web address]    SARS-CoV-2 assay is a Nucleic Acid Amplification assay intended for the  qualitative detection of nucleic acid from SARS-CoV-2 in nasopharyngeal  swabs. Results are for the presumptive identification of SARS-CoV-2 RNA. Positive results are indicative of infection with SARS-CoV-2, the virus  causing COVID-19, but do not rule out bacterial infection or co-infection  with other viruses. Laboratories within the [facility] and its  territories are required to report all positive results to the appropriate  public health authorities. Negative results do not preclude SARS-CoV-2  infection and should not be used as the sole basis for treatment or other  patient management decisions. Negative results must be combined with  clinical observations, patient history, and epidemiological information. This test has not been FDA cleared or approved. This test has been authorized by FDA under an Emergency Use Authorization  (EUA).  This test is only authorized for the duration of time the  declaration that circumstances exist justifying the authorization of the  emergency use of an in vitro diagnostic tests for detection of SARS-CoV-2  virus and/or diagnosis of COVID-19 infection under section 564(b)(1) of  the Act, 21 U. S.C. 959EES-8(I)(0), unless the authorization is terminated  or revoked sooner. The test has been validated but independent review by FDA  and CLIA is pending. Test performed using [employer] GeneXpert: This RT-PCR assay targets N2,  a region unique to SARS-CoV-2. A conserved region in the E-gene was chosen  for pan-Sarbecovirus detection which includes SARS-CoV-2. According to CMS-2020-01-R, this platform meets the definition of high-throughput technology.     B-Type Natriuretic Peptide(BNP) [[id number]]  (Abnormal) Collected: 11/07/23 1337    Lab Status: Final result Specimen: Blood from Arm, Right Updated: 11/07/23 1417      pg/mL     HS Troponin 0hr (reflex protocol) [[id number]]  (Normal) Collected: 11/07/23 1337    Lab Status: Final result Specimen: Blood from Arm, Right Updated: 11/07/23 1406     hs TnI 0hr 5 ng/L     Comprehensive metabolic panel [[id number]]  (Abnormal) Collected: 11/07/23 1337    Lab Status: Final result Specimen: Blood from Arm, Right Updated: 11/07/23 1358     Sodium 139 mmol/L      Potassium 4.2 mmol/L      Chloride 108 mmol/L      CO2 23 mmol/L      ANION GAP 8 mmol/L      BUN 18 mg/dL      Creatinine 1.01 mg/dL      Glucose 105 mg/dL      Calcium 9.0 mg/dL      AST 26 U/L      ALT 29 U/L      Alkaline Phosphatase 55 U/L      Total Protein 6.3 g/dL      Albumin 3.9 g/dL      Total Bilirubin 0.43 mg/dL      eGFR 72 ml/min/1.73sq m     Narrative:      [place] guidelines for Chronic Kidney Disease (CKD):     Stage 1 with normal or high GFR (GFR > 90 mL/min/1.73 square meters)    Stage 2 Mild CKD (GFR = 60-89 mL/min/1.73 square meters)    Stage 3A Moderate CKD (GFR = 45-59 mL/min/1.73 square meters)    Stage 3B Moderate CKD (GFR = 30-44 mL/min/1.73 square meters)    Stage 4 Severe CKD (GFR = 15-29 mL/min/1.73 square meters)    Stage 5 End Stage CKD (GFR <15 mL/min/1.73 square meters)  Note: GFR calculation is accurate only with a steady state creatinine    CBC and differential [[id number]] Collected: 11/07/23 1337    Lab Status: Final result Specimen: Blood from Arm, Right Updated: 11/07/23 1343     WBC 5.04 Thousand/uL      RBC 5.05 Million/uL      Hemoglobin 16.0 g/dL      Hematocrit 47.3 %      MCV 94 fL      MCH 31.7 pg      MCHC 33.8 g/dL      RDW 12.8 %      MPV 10.6 fL      Platelets 056 Thousands/uL      nRBC 0 /100 WBCs      Neutrophils Relative 62 %      Immat GRANS % 0 %      Lymphocytes Relative 28 %      Monocytes Relative 9 %      Eosinophils Relative 1 %      Basophils Relative 0 %      Neutrophils Absolute 3.09 Thousands/µL      Immature Grans Absolute 0.01 Thousand/uL      Lymphocytes Absolute 1.42 Thousands/µL      Monocytes Absolute 0.46 Thousand/µL      Eosinophils Absolute 0.04 Thousand/µL      Basophils Absolute 0.02 Thousands/µL                    XR chest 2 views   Final Result by Aurora Chicas MD (11/08 9064)      No acute cardiopulmonary disease. Workstation performed: [id number]                    Procedures  Procedures         ED Course                               SBIRT 22yo+      Flowsheet Row Most Recent Value   Initial Alcohol Screen: US AUDIT-C     1. How often do you have a drink containing alcohol? 0 Filed at: 11/07/2023 1233   2. How many drinks containing alcohol do you have on a typical day you are drinking? 0 Filed at: 11/07/2023 1233   3a. Male UNDER 65: How often do you have five or more drinks on one occasion? 0 Filed at: 11/07/2023 1233   Audit-C Score 0 Filed at: 11/07/2023 1233   AUDI: How many times in the past year have you. .. Used an illegal drug or used a prescription medication for non-medical reasons? Never Filed at: 11/07/2023 1233                      Medical Decision Making  1320: Patient appears well, vital signs reviewed.   Patient has a history of cardiomyopathy, EF of 25%. Patient has no findings of fluid overload on exam.  Patient has recent flu vaccine and development of flulike symptoms. Concerns for viral URI. Given his previous history plan to complete basic labs including cardiac enzymes, BNP, check chest x-ray. Send COVID/flu/RSV PCR. Patient currently on Eliquis and has been compliant with medication. 1430: Chest x-ray and labs reviewed. Patient has remained stable throughout ED course. Patient feels even improved with antibiotics, has a history of bronchitis. I will give a trial of antibiotics. Close follow-up with PCP. Amount and/or Complexity of Data Reviewed  Labs: ordered. Radiology: ordered and independent interpretation performed. Details: Chest x-ray no acute pathology  ECG/medicine tests: ordered and independent interpretation performed. Risk  Prescription drug management. Disposition  Final diagnoses:   Atrial fibrillation with rapid ventricular response (HCC)   Fatigue   Adverse effect of vaccine, initial encounter   Bronchitis     Time reflects when diagnosis was documented in both MDM as applicable and the Disposition within this note       Time User Action Codes Description Comment    11/7/2023  2:50 PM Velazquez Burly Add [I48.91] Atrial fibrillation with rapid ventricular response ([address])     11/7/2023  2:50 PM Velazquez Lovelyly Add [R53.83] Fatigue     11/7/2023  2:50 PM Velazquez Lovelyly Add [T50. Z95A] Adverse effect of vaccine, initial encounter     11/7/2023  3:18 PM Velazquez Burly Add [J40] Bronchitis     11/7/2023  3:18 PM Velazquez Lovelyly Modify [I48.91] Atrial fibrillation with rapid ventricular response ([address])     11/7/2023  3:18 PM Caroline Perea Modify [J40] Bronchitis           ED Disposition       ED Disposition   Discharge    Condition   Stable    Date/Time   Tue Nov 7, 2023 [address] discharge to home/self care.                    Follow-up Information       Follow up With Specialties Details Why Contact Info    Fany Brewer MD Family Medicine Schedule an appointment as soon as possible for a visit   [address] [place] [id number]  [phone]              Discharge Medication List as of 11/7/2023  2:51 PM        CONTINUE these medications which have NOT CHANGED    Details   apixaban (ELIQUIS) 5 mg Apixaban Oral  PO 1.0  BID    active, Historical Med      Empagliflozin (Jardiance) 25 MG TABS Take by mouth every morning, Historical Med      spironolactone (ALDACTONE) 25 mg tablet Take 25 mg by mouth daily, Historical Med      Docusate Sodium 100 MG capsule Docusate Sodium Oral     1 bid    active, Historical Med      finasteride (PROSCAR) 5 mg tablet Finasteride Oral (Proscar)     1 a day   inactive, Historical Med      levothyroxine 75 mcg tablet Levothyroxine Oral    QD    inactive, Historical Med      lisinopril (ZESTRIL) 5 mg tablet Lisinopril Oral     once daily   active, Historical Med      metoprolol succinate (TOPROL-XL) 50 mg 24 hr tablet Metoprolol Oral 24 hr Tab (Succinate)    mg BID    active, Historical Med      rosuvastatin (CRESTOR) 20 MG tablet Rosuvastatin Calcium Oral     1 daily  in the am   active, Historical Med      tamsulosin (FLOMAX) 0.4 mg Tamsulosin Oral     1 x a day half hr after supper   inactive, Historical Med             No discharge procedures on file.     PDMP Review       None            ED Provider  Electronically Signed by             Trell Morales MD  11/08/23 0372

## 2023-12-19 ENCOUNTER — DOCTOR'S OFFICE (OUTPATIENT)
Dept: URBAN - NONMETROPOLITAN AREA CLINIC 1 | Facility: CLINIC | Age: 73
Setting detail: OPHTHALMOLOGY
End: 2023-12-19
Payer: COMMERCIAL

## 2023-12-19 DIAGNOSIS — H16.222: ICD-10-CM

## 2023-12-19 DIAGNOSIS — H43.813: ICD-10-CM

## 2023-12-19 DIAGNOSIS — H16.221: ICD-10-CM

## 2023-12-19 DIAGNOSIS — H35.373: ICD-10-CM

## 2023-12-19 DIAGNOSIS — H18.513: ICD-10-CM

## 2023-12-19 DIAGNOSIS — Z96.1: ICD-10-CM

## 2023-12-19 PROCEDURE — 92134 CPTRZ OPH DX IMG PST SGM RTA: CPT | Performed by: OPHTHALMOLOGY

## 2023-12-19 PROCEDURE — 99214 OFFICE O/P EST MOD 30 MIN: CPT | Performed by: OPHTHALMOLOGY

## 2023-12-19 ASSESSMENT — SPHEQUIV_DERIVED
OD_SPHEQUIV: 0.5
OS_SPHEQUIV: 0.25

## 2023-12-19 ASSESSMENT — CORNEAL DYSTROPHY - POSTERIOR
OS_POSTERIORDYSTROPHY: GUTTATA
OD_POSTERIORDYSTROPHY: GUTTATA

## 2023-12-19 ASSESSMENT — REFRACTION_CURRENTRX
OS_SPHERE: +2.00
OD_SPHERE: +2.00
OS_OVR_VA: 20/
OD_OVR_VA: 20/
OD_OVR_VA: 20/
OS_OVR_VA: 20/

## 2023-12-19 ASSESSMENT — CONFRONTATIONAL VISUAL FIELD TEST (CVF)
OS_FINDINGS: FULL
OD_FINDINGS: FULL

## 2023-12-19 ASSESSMENT — REFRACTION_AUTOREFRACTION
OS_SPHERE: +0.75
OS_AXIS: 020
OD_CYLINDER: -0.50
OD_SPHERE: +0.75
OS_CYLINDER: -1.00
OD_AXIS: 087

## 2023-12-19 ASSESSMENT — DRY EYES - PHYSICIAN NOTES: OS_GENERALCOMMENTS: MILD KSICCA

## 2024-04-25 ENCOUNTER — DOCTOR'S OFFICE (OUTPATIENT)
Dept: URBAN - NONMETROPOLITAN AREA CLINIC 1 | Facility: CLINIC | Age: 74
Setting detail: OPHTHALMOLOGY
End: 2024-04-25
Payer: MEDICARE

## 2024-04-25 VITALS — HEIGHT: 60 IN

## 2024-04-25 DIAGNOSIS — H43.813: ICD-10-CM

## 2024-04-25 DIAGNOSIS — H35.373: ICD-10-CM

## 2024-04-25 PROCEDURE — 99213 OFFICE O/P EST LOW 20 MIN: CPT | Performed by: OPHTHALMOLOGY

## 2024-04-25 PROCEDURE — 92134 CPTRZ OPH DX IMG PST SGM RTA: CPT | Performed by: OPHTHALMOLOGY

## 2024-05-16 ENCOUNTER — APPOINTMENT (EMERGENCY)
Dept: CT IMAGING | Facility: HOSPITAL | Age: 74
End: 2024-05-16
Payer: MEDICARE

## 2024-05-16 ENCOUNTER — APPOINTMENT (EMERGENCY)
Dept: RADIOLOGY | Facility: HOSPITAL | Age: 74
End: 2024-05-16
Payer: MEDICARE

## 2024-05-16 ENCOUNTER — HOSPITAL ENCOUNTER (OUTPATIENT)
Facility: HOSPITAL | Age: 74
Setting detail: OBSERVATION
Discharge: HOME | End: 2024-05-17
Attending: EMERGENCY MEDICINE | Admitting: INTERNAL MEDICINE
Payer: MEDICARE

## 2024-05-16 DIAGNOSIS — I48.91 A-FIB (HCC): ICD-10-CM

## 2024-05-16 DIAGNOSIS — I95.9 HYPOTENSION (ARTERIAL): ICD-10-CM

## 2024-05-16 DIAGNOSIS — I95.1 ORTHOSTATIC HYPOTENSION: ICD-10-CM

## 2024-05-16 DIAGNOSIS — R55 SYNCOPE AND COLLAPSE: Primary | ICD-10-CM

## 2024-05-16 DIAGNOSIS — N17.9 AKI (ACUTE KIDNEY INJURY) (HCC): ICD-10-CM

## 2024-05-16 DIAGNOSIS — Z79.01 CHRONIC ANTICOAGULATION: ICD-10-CM

## 2024-05-16 PROBLEM — I10 ESSENTIAL HYPERTENSION: Status: ACTIVE | Noted: 2024-05-16

## 2024-05-16 LAB
2HR DELTA HS TROPONIN: -1 NG/L
4HR DELTA HS TROPONIN: -2 NG/L
ABO GROUP BLD: NORMAL
ABO GROUP BLD: NORMAL
ALBUMIN SERPL BCP-MCNC: 4 G/DL (ref 3.5–5)
ALP SERPL-CCNC: 47 U/L (ref 34–104)
ALT SERPL W P-5'-P-CCNC: 17 U/L (ref 7–52)
ANION GAP SERPL CALCULATED.3IONS-SCNC: 7 MMOL/L (ref 4–13)
APTT PPP: 28 SECONDS (ref 23–37)
AST SERPL W P-5'-P-CCNC: 22 U/L (ref 13–39)
BASOPHILS # BLD AUTO: 0.02 THOUSANDS/ÂΜL (ref 0–0.1)
BASOPHILS NFR BLD AUTO: 0 % (ref 0–1)
BILIRUB SERPL-MCNC: 0.5 MG/DL (ref 0.2–1)
BLD GP AB SCN SERPL QL: NEGATIVE
BNP SERPL-MCNC: 63 PG/ML (ref 0–100)
BUN SERPL-MCNC: 27 MG/DL (ref 5–25)
CALCIUM SERPL-MCNC: 9.2 MG/DL (ref 8.4–10.2)
CARDIAC TROPONIN I PNL SERPL HS: 4 NG/L
CARDIAC TROPONIN I PNL SERPL HS: 5 NG/L
CARDIAC TROPONIN I PNL SERPL HS: 6 NG/L
CHLORIDE SERPL-SCNC: 107 MMOL/L (ref 96–108)
CO2 SERPL-SCNC: 25 MMOL/L (ref 21–32)
CREAT SERPL-MCNC: 1.98 MG/DL (ref 0.6–1.3)
EOSINOPHIL # BLD AUTO: 0.03 THOUSAND/ÂΜL (ref 0–0.61)
EOSINOPHIL NFR BLD AUTO: 0 % (ref 0–6)
ERYTHROCYTE [DISTWIDTH] IN BLOOD BY AUTOMATED COUNT: 13.2 % (ref 11.6–15.1)
GFR SERPL CREATININE-BSD FRML MDRD: 32 ML/MIN/1.73SQ M
GLUCOSE SERPL-MCNC: 119 MG/DL (ref 65–140)
HCT VFR BLD AUTO: 41.3 % (ref 36.5–49.3)
HGB BLD-MCNC: 13.6 G/DL (ref 12–17)
IMM GRANULOCYTES # BLD AUTO: 0.04 THOUSAND/UL (ref 0–0.2)
IMM GRANULOCYTES NFR BLD AUTO: 1 % (ref 0–2)
INR PPP: 1.35 (ref 0.84–1.19)
LYMPHOCYTES # BLD AUTO: 1.09 THOUSANDS/ÂΜL (ref 0.6–4.47)
LYMPHOCYTES NFR BLD AUTO: 15 % (ref 14–44)
MCH RBC QN AUTO: 32.1 PG (ref 26.8–34.3)
MCHC RBC AUTO-ENTMCNC: 32.9 G/DL (ref 31.4–37.4)
MCV RBC AUTO: 97 FL (ref 82–98)
MONOCYTES # BLD AUTO: 0.71 THOUSAND/ÂΜL (ref 0.17–1.22)
MONOCYTES NFR BLD AUTO: 9 % (ref 4–12)
NEUTROPHILS # BLD AUTO: 5.65 THOUSANDS/ÂΜL (ref 1.85–7.62)
NEUTS SEG NFR BLD AUTO: 75 % (ref 43–75)
NRBC BLD AUTO-RTO: 0 /100 WBCS
PLATELET # BLD AUTO: 218 THOUSANDS/UL (ref 149–390)
PMV BLD AUTO: 10 FL (ref 8.9–12.7)
POTASSIUM SERPL-SCNC: 4.7 MMOL/L (ref 3.5–5.3)
PROT SERPL-MCNC: 6.6 G/DL (ref 6.4–8.4)
PROTHROMBIN TIME: 16.5 SECONDS (ref 11.6–14.5)
RBC # BLD AUTO: 4.24 MILLION/UL (ref 3.88–5.62)
RH BLD: POSITIVE
RH BLD: POSITIVE
SODIUM SERPL-SCNC: 139 MMOL/L (ref 135–147)
SPECIMEN EXPIRATION DATE: NORMAL
WBC # BLD AUTO: 7.54 THOUSAND/UL (ref 4.31–10.16)

## 2024-05-16 PROCEDURE — 70450 CT HEAD/BRAIN W/O DYE: CPT

## 2024-05-16 PROCEDURE — 86901 BLOOD TYPING SEROLOGIC RH(D): CPT | Performed by: EMERGENCY MEDICINE

## 2024-05-16 PROCEDURE — 85730 THROMBOPLASTIN TIME PARTIAL: CPT | Performed by: EMERGENCY MEDICINE

## 2024-05-16 PROCEDURE — 99285 EMERGENCY DEPT VISIT HI MDM: CPT

## 2024-05-16 PROCEDURE — 83880 ASSAY OF NATRIURETIC PEPTIDE: CPT | Performed by: EMERGENCY MEDICINE

## 2024-05-16 PROCEDURE — 71260 CT THORAX DX C+: CPT

## 2024-05-16 PROCEDURE — 84484 ASSAY OF TROPONIN QUANT: CPT | Performed by: EMERGENCY MEDICINE

## 2024-05-16 PROCEDURE — 71045 X-RAY EXAM CHEST 1 VIEW: CPT

## 2024-05-16 PROCEDURE — 84484 ASSAY OF TROPONIN QUANT: CPT | Performed by: PHYSICIAN ASSISTANT

## 2024-05-16 PROCEDURE — 85025 COMPLETE CBC W/AUTO DIFF WBC: CPT | Performed by: EMERGENCY MEDICINE

## 2024-05-16 PROCEDURE — 72125 CT NECK SPINE W/O DYE: CPT

## 2024-05-16 PROCEDURE — 96360 HYDRATION IV INFUSION INIT: CPT

## 2024-05-16 PROCEDURE — 36415 COLL VENOUS BLD VENIPUNCTURE: CPT | Performed by: EMERGENCY MEDICINE

## 2024-05-16 PROCEDURE — 74177 CT ABD & PELVIS W/CONTRAST: CPT

## 2024-05-16 PROCEDURE — 93005 ELECTROCARDIOGRAM TRACING: CPT

## 2024-05-16 PROCEDURE — 99291 CRITICAL CARE FIRST HOUR: CPT | Performed by: EMERGENCY MEDICINE

## 2024-05-16 PROCEDURE — 80053 COMPREHEN METABOLIC PANEL: CPT | Performed by: EMERGENCY MEDICINE

## 2024-05-16 PROCEDURE — 99222 1ST HOSP IP/OBS MODERATE 55: CPT | Performed by: PHYSICIAN ASSISTANT

## 2024-05-16 PROCEDURE — 85610 PROTHROMBIN TIME: CPT | Performed by: EMERGENCY MEDICINE

## 2024-05-16 PROCEDURE — 86900 BLOOD TYPING SEROLOGIC ABO: CPT | Performed by: EMERGENCY MEDICINE

## 2024-05-16 PROCEDURE — 86850 RBC ANTIBODY SCREEN: CPT | Performed by: EMERGENCY MEDICINE

## 2024-05-16 RX ORDER — TAMSULOSIN HYDROCHLORIDE 0.4 MG/1
0.4 CAPSULE ORAL
Status: DISCONTINUED | OUTPATIENT
Start: 2024-05-17 | End: 2024-05-17 | Stop reason: HOSPADM

## 2024-05-16 RX ORDER — SODIUM CHLORIDE 9 MG/ML
40 INJECTION, SOLUTION INTRAVENOUS CONTINUOUS
Status: DISCONTINUED | OUTPATIENT
Start: 2024-05-16 | End: 2024-05-17 | Stop reason: HOSPADM

## 2024-05-16 RX ORDER — DOCUSATE SODIUM 100 MG/1
100 CAPSULE, LIQUID FILLED ORAL 2 TIMES DAILY
Status: DISCONTINUED | OUTPATIENT
Start: 2024-05-16 | End: 2024-05-17 | Stop reason: HOSPADM

## 2024-05-16 RX ORDER — FINASTERIDE 5 MG/1
5 TABLET, FILM COATED ORAL DAILY
Status: DISCONTINUED | OUTPATIENT
Start: 2024-05-17 | End: 2024-05-17 | Stop reason: HOSPADM

## 2024-05-16 RX ORDER — METOPROLOL SUCCINATE 50 MG/1
50 TABLET, EXTENDED RELEASE ORAL DAILY
Status: DISCONTINUED | OUTPATIENT
Start: 2024-05-17 | End: 2024-05-17 | Stop reason: HOSPADM

## 2024-05-16 RX ORDER — ATORVASTATIN CALCIUM 40 MG/1
40 TABLET, FILM COATED ORAL
Status: DISCONTINUED | OUTPATIENT
Start: 2024-05-17 | End: 2024-05-17 | Stop reason: HOSPADM

## 2024-05-16 RX ORDER — ACETAMINOPHEN 325 MG/1
650 TABLET ORAL EVERY 6 HOURS PRN
Status: DISCONTINUED | OUTPATIENT
Start: 2024-05-16 | End: 2024-05-17 | Stop reason: HOSPADM

## 2024-05-16 RX ORDER — ONDANSETRON 2 MG/ML
4 INJECTION INTRAMUSCULAR; INTRAVENOUS EVERY 6 HOURS PRN
Status: DISCONTINUED | OUTPATIENT
Start: 2024-05-16 | End: 2024-05-17 | Stop reason: HOSPADM

## 2024-05-16 RX ORDER — LEVOTHYROXINE SODIUM 0.07 MG/1
75 TABLET ORAL
Status: DISCONTINUED | OUTPATIENT
Start: 2024-05-17 | End: 2024-05-17 | Stop reason: HOSPADM

## 2024-05-16 RX ADMIN — DOCUSATE SODIUM 100 MG: 100 CAPSULE, LIQUID FILLED ORAL at 21:43

## 2024-05-16 RX ADMIN — SODIUM CHLORIDE 1000 ML: 0.9 INJECTION, SOLUTION INTRAVENOUS at 19:45

## 2024-05-16 RX ADMIN — IOHEXOL 100 ML: 350 INJECTION, SOLUTION INTRAVENOUS at 19:01

## 2024-05-16 RX ADMIN — SODIUM CHLORIDE 50 ML/HR: 0.9 INJECTION, SOLUTION INTRAVENOUS at 21:43

## 2024-05-16 RX ADMIN — APIXABAN 5 MG: 5 TABLET, FILM COATED ORAL at 21:48

## 2024-05-16 NOTE — ED PROVIDER NOTES
Emergency Department Trauma Note  Prieto Tyson Sr. 74 y.o. male MRN: [id number]  Unit/Bed#: RM22/RM22 Encounter: [id number]      Trauma Alert: Trauma Acuity: Trauma Evaluation  Model of Arrival: Mode of Arrival: ALS via Trauma Squad Name and Number: Medic 11  Trauma Team: Current Providers  Attending Provider: Miguel A Ling DO  Attending Provider: Enrrique Hawkins DO  ED Technician: Aliyah Lema  Registered Nurse: Blanca Locke RN  Consultants:     None      History of Present Illness     Chief Complaint:   Chief Complaint   Patient presents with    Syncope     Witnessed syncopal episode. Fell to the ground hit head. Takes eliquis. States that he felt dizzy at the time. Per EMS BP was 70s systolic received approx 500ml of fluids from EMS      HPI:  Prieto Tyson Sr. is a 74 y.o. male who presents with syncope and fall with headstrike on Eliquis.  Patient provides his own history.  He reports that he was brought in by EMS for syncopal episode today he endorses feeling some lightheadedness and dizziness throughout the day.  He reports that he was active outside today and played 14 holes of golf and did feel some dizziness at that time.  Denies prior syncope.  Reports she does have a history of A-fib and prior syncopal episodes reports recent cardioversion which has kept him in sinus rhythm.  Denies any recent medication changes.  He reports that after playing golf he went into the clubhouse to eat and sat at the bar and ate and then went to use the restroom.  He reports when he got up from the toilet he began to feel lightheaded and dizzy and fell to the ground reportedly on his buttocks but cannot rule out head strike.  He reports that he was attended to by an EMT who was present at the time and that he has felt better since then and they gave some IV fluids during transportation to the hospital.  He denies chest pain shortness of breath nausea vomiting numbness tingling back pain flank pain speech difficulty  neurologic weakness.      Mechanism:Details of Incident: pt was golfing and got dizzy and had a syncopal episode and fell. Takes eliquis, no head strike Injury Date: 05/16/24 Injury Time: 1700 Injury Occurence Location - Specify County: [facility]  Review of Systems   Constitutional:  Negative for chills and fever.   HENT:  Negative for ear pain and sore throat.    Eyes:  Negative for pain and visual disturbance.   Respiratory:  Negative for cough and shortness of breath.    Cardiovascular:  Negative for chest pain and palpitations.   Gastrointestinal:  Negative for abdominal pain and vomiting.   Genitourinary:  Negative for dysuria and hematuria.   Musculoskeletal:  Negative for arthralgias and back pain.   Skin:  Negative for color change and rash.   Neurological:  Positive for dizziness, syncope and light-headedness. Negative for seizures.   All other systems reviewed and are negative.      Historical Information     Immunizations:   Immunization History   Administered Date(s) Administered    COVID-19 MODERNA VACC 0.5 ML IM 12/03/2021       Past Medical History:   Diagnosis Date    Cardiac angina (HCC)     EF of 25%    Coronary artery disease     Hypertension      History reviewed. No pertinent family history.  History reviewed. No pertinent surgical history.  Social History     Tobacco Use    Smoking status: Never     Passive exposure: Never    Smokeless tobacco: Never   Vaping Use    Vaping status: Never Used   Substance Use Topics    Alcohol use: Not Currently    Drug use: Not Currently     E-Cigarette/Vaping    E-Cigarette Use Never User      E-Cigarette/Vaping Substances       Family History: non-contributory    Meds/Allergies   Prior to Admission Medications   Prescriptions Last Dose Informant Patient Reported? Taking?   Docusate Sodium 100 MG capsule   Yes No   Sig: Docusate Sodium Oral     1 bid    active   Empagliflozin (Jardiance) 25 MG TABS   Yes No   Sig: Take by mouth every morning    apixaban (ELIQUIS) 5 mg   Yes No   Sig: Apixaban Oral  PO 1.0  BID    active   finasteride (PROSCAR) 5 mg tablet   Yes No   Sig: Finasteride Oral (Proscar)     1 a day   inactive   levothyroxine 75 mcg tablet   Yes No   Sig: Levothyroxine Oral    QD    inactive   lisinopril (ZESTRIL) 5 mg tablet   Yes No   Sig: Lisinopril Oral     once daily   active   metoprolol succinate (TOPROL-XL) 50 mg 24 hr tablet   Yes No   Sig: Metoprolol Oral 24 hr Tab (Succinate)    mg BID    active   rosuvastatin (CRESTOR) 20 MG tablet   Yes No   Sig: Rosuvastatin Calcium Oral     1 daily  in the am   active   spironolactone (ALDACTONE) 25 mg tablet   Yes No   Sig: Take 25 mg by mouth daily   tamsulosin (FLOMAX) 0.4 mg   Yes No   Sig: Tamsulosin Oral     1 x a day half hr after supper   inactive      Facility-Administered Medications: None       Allergies   Allergen Reactions    Amoxicillin Hives     Thrush       PHYSICAL EXAM    PE limited by:     Objective   Vitals:   First set: Temperature: (!) 97.1 °F (36.2 °C) (05/16/24 1827)  Pulse: 60 (05/16/24 1828)  Respirations: 18 (05/16/24 1828)  Blood Pressure: 107/54 (05/16/24 1828)  SpO2: 93 % (05/16/24 1828)    Primary Survey:   (A) Airway: intact  (B) Breathing: equal, spontaneous, unlabored  (C) Circulation: Pulses:   normal  (D) Disabliity:  GCS Total:  15  (E) Expose:  Completed    Secondary Survey: (Click on Physical Exam tab above)  Physical Exam  Vitals and nursing note reviewed. Exam conducted with a chaperone present.   Constitutional:       General: He is not in acute distress.     Appearance: Normal appearance. He is not ill-appearing, toxic-appearing or diaphoretic.   HENT:      Head: Normocephalic and atraumatic.      Right Ear: External ear normal.      Left Ear: External ear normal.      Nose: Nose normal.      Mouth/Throat:      Mouth: Mucous membranes are moist.      Pharynx: Oropharynx is clear.   Eyes:      Conjunctiva/sclera: Conjunctivae normal.   Cardiovascular:       Rate and Rhythm: Normal rate and regular rhythm.      Pulses: Normal pulses.      Heart sounds: Normal heart sounds.   Pulmonary:      Effort: Pulmonary effort is normal.      Breath sounds: Normal breath sounds. No wheezing, rhonchi or rales.   Abdominal:      General: Abdomen is flat.      Tenderness: There is no abdominal tenderness. There is no guarding or rebound.   Musculoskeletal:      Cervical back: Neck supple.      Right lower leg: No edema.      Left lower leg: No edema.   Skin:     General: Skin is warm and dry.      Capillary Refill: Capillary refill takes less than 2 seconds.   Neurological:      General: No focal deficit present.      Mental Status: He is alert.         Cervical spine cleared by clinical criteria? No (imaging required)      Invasive Devices       Peripheral Intravenous Line  Duration             Peripheral IV 05/16/24 Distal;Left;Upper;Ventral (anterior) Antecubital <1 day    Peripheral IV 05/16/24 Left;Ventral (anterior) Wrist <1 day                    Lab Results:   Results Reviewed       Procedure Component Value Units Date/Time    HS Troponin I 2hr [[id number]] Collected: 05/16/24 2014    Lab Status: In process Specimen: Blood from Arm, Left Updated: 05/16/24 2029    HS Troponin I 4hr [[id number]]     Lab Status: No result Specimen: Blood     B-Type Natriuretic Peptide(BNP) [[id number]]  (Normal) Collected: 05/16/24 1840    Lab Status: Final result Specimen: Blood from Arm, Left Updated: 05/16/24 1939     BNP 63 pg/mL     HS Troponin 0hr (reflex protocol) [[id number]]  (Normal) Collected: 05/16/24 1840    Lab Status: Final result Specimen: Blood from Arm, Left Updated: 05/16/24 1922     hs TnI 0hr 6 ng/L     Comprehensive metabolic panel [[id number]]  (Abnormal) Collected: 05/16/24 1840    Lab Status: Final result Specimen: Blood from Arm, Left Updated: 05/16/24 1917     Sodium 139 mmol/L      Potassium 4.7 mmol/L      Chloride 107 mmol/L      CO2 25 mmol/L      ANION GAP 7 mmol/L       BUN 27 mg/dL      Creatinine 1.98 mg/dL      Glucose 119 mg/dL      Calcium 9.2 mg/dL      AST 22 U/L      ALT 17 U/L      Alkaline Phosphatase 47 U/L      Total Protein 6.6 g/dL      Albumin 4.0 g/dL      Total Bilirubin 0.50 mg/dL      eGFR 32 ml/min/1.73sq m     Narrative:      National Kidney Disease Foundation guidelines for Chronic Kidney Disease (CKD):     Stage 1 with normal or high GFR (GFR > 90 mL/min/1.73 square meters)    Stage 2 Mild CKD (GFR = 60-89 mL/min/1.73 square meters)    Stage 3A Moderate CKD (GFR = 45-59 mL/min/1.73 square meters)    Stage 3B Moderate CKD (GFR = 30-44 mL/min/1.73 square meters)    Stage 4 Severe CKD (GFR = 15-29 mL/min/1.73 square meters)    Stage 5 End Stage CKD (GFR <15 mL/min/1.73 square meters)  Note: GFR calculation is accurate only with a steady state creatinine    Protime-INR [[id number]]  (Abnormal) Collected: 05/16/24 1840    Lab Status: Final result Specimen: Blood from Arm, Left Updated: 05/16/24 1915     Protime 16.5 seconds      INR 1.35    APTT [[id number]]  (Normal) Collected: 05/16/24 1840    Lab Status: Final result Specimen: Blood from Arm, Left Updated: 05/16/24 1915     PTT 28 seconds     CBC and differential [[id number]] Collected: 05/16/24 1840    Lab Status: Final result Specimen: Blood from Arm, Left Updated: 05/16/24 1900     WBC 7.54 Thousand/uL      RBC 4.24 Million/uL      Hemoglobin 13.6 g/dL      Hematocrit 41.3 %      MCV 97 fL      MCH 32.1 pg      MCHC 32.9 g/dL      RDW 13.2 %      MPV 10.0 fL      Platelets 218 Thousands/uL      nRBC 0 /100 WBCs      Segmented % 75 %      Immature Grans % 1 %      Lymphocytes % 15 %      Monocytes % 9 %      Eosinophils Relative 0 %      Basophils Relative 0 %      Absolute Neutrophils 5.65 Thousands/µL      Absolute Immature Grans 0.04 Thousand/uL      Absolute Lymphocytes 1.09 Thousands/µL      Absolute Monocytes 0.71 Thousand/µL      Eosinophils Absolute 0.03 Thousand/µL      Basophils Absolute 0.02  Thousands/µL                    Imaging Studies:   Direct to CT: No  TRAUMA - CT head wo contrast   Final Result by David Bean MD (05/16 1918)      No acute intracranial abnormality.                  Workstation performed: [id number]         TRAUMA - CT spine cervical wo contrast   Final Result by David Bean MD (05/16 1921)      No cervical spine fracture or traumatic malalignment.                  Workstation performed: [id number]         TRAUMA - CT chest abdomen pelvis w contrast   Final Result by David Bean MD (05/16 1931)      No findings of acute traumatic injury in the chest, abdomen or pelvis.      A few scattered bilateral pulmonary nodules measuring up to 3 mm. Based on current Fleischner Society 2017 Guidelines on incidental pulmonary nodule, no routine follow-up is needed if the patient is low risk. If the patient is high risk, optional    follow-up chest CT at 12 months can be considered.      Scattered colonic diverticulosis with no inflammatory changes present to suggest acute diverticulitis.      Workstation performed: [id number]         XR Trauma chest portable   Final Result by David Bean MD (05/16 1933)      No acute cardiopulmonary disease.            Workstation performed: [id number]               Procedures  CriticalCare Time    Date/Time: 5/16/2024 8:19 PM    Performed by: Miguel A Ling DO  Authorized by: Miguel A Ling DO    Critical care provider statement:     Critical care time (minutes):  36    Critical care time was exclusive of:  Separately billable procedures and treating other patients and teaching time    Critical care was necessary to treat or prevent imminent or life-threatening deterioration of the following conditions:  Dehydration, cardiac failure and circulatory failure    Critical care was time spent personally by me on the following activities:  Development of treatment plan with patient or surrogate, discussions with  consultants, obtaining history from patient or surrogate, evaluation of patient's response to treatment, discussions with primary provider, examination of patient, review of old charts, re-evaluation of patient's condition, ordering and review of radiographic studies, ordering and review of laboratory studies and ordering and performing treatments and interventions    I assumed direction of critical care for this patient from another provider in my specialty: no             ED Course  ED Course as of 05/16/24 2053   u May 16, 2024   1830 Blood Pressure: 107/54   1830 Temperature(!): 97.1 °F (36.2 °C)   1830 Pulse: 60   1830 Respirations: 18   1830 SpO2: 93 %   1833 EKG interpreted by myself.  EKG dated 5/16/2024 at 1828 demonstrates sinus rhythm with first-degree AV block at 60 bpm, prolonged AL interval, prolonged QRS interval with chronic right bundle branch block pattern, top normal QTc interval, no STEMI.  No acute ischemic changes when compared to prior EKG on file from November 7, 2023.   1904 WBC: 7.54   1921 GFR, Calculated: 32   1921 Creatinine(!): 1.98   1921 BUN(!): 27   1921 KARTHIKEYAN           Medical Decision Making  74-year-old male presenting to the emergency department for evaluation of syncopal episode.  He was noted to be hypotensive for EMS he received some IV fluids on arrival here he had low normal blood pressure with positive orthostatic testing with a systolic blood pressure less than 90 with standing.  Based on age, fall on anticoagulation and possible head strike CT imaging was obtained which does not reveal any intracranial hemorrhage, bleeding or acute traumatic injury.  Patient was noted on labs to have normal troponin and BNP but elevated creatinine and reduced GFR consistent with KARTHIKEYAN which is likely due to hypovolemia.  Patient administered additional IV fluids with improvement in blood pressure.  Based on age, comorbidities, and significant KARTHIKEYAN and objective hypotension although  improvement plan for observation for IV hydration, repeat labs in a.m., cardiac monitoring.    Problems Addressed:  Chronic anticoagulation: chronic illness or injury that poses a threat to life or bodily functions  Hypotension (arterial): acute illness or injury  Syncope and collapse: acute illness or injury    Amount and/or Complexity of Data Reviewed  Labs: ordered. Decision-making details documented in ED Course.  Radiology: ordered.  ECG/medicine tests: ordered and independent interpretation performed. Decision-making details documented in ED Course.    Risk  Prescription drug management.  Decision regarding hospitalization.                Disposition  [facility] Transfer: No  Final diagnoses:   Syncope and collapse   Chronic anticoagulation   Hypotension (arterial)   KARTHIKEYAN (acute kidney injury) (HCC)   Orthostatic hypotension     Time reflects when diagnosis was documented in both MDM as applicable and the Disposition within this note       Time User Action Codes Description Comment    5/16/2024  7:07 PM Miguel A Ling [R55] Syncope and collapse     5/16/2024  7:08 PM Miguel A Ling [Z79.01] Chronic anticoagulation     5/16/2024  7:08 PM Miguel A Ling [I95.9] Hypotension (arterial)     5/16/2024  8:17 PM Miguel A Ling [N17.9] KARTHIKEYAN (acute kidney injury) (HCC)     5/16/2024  8:17 PM Miguel A Ling [I95.1] Orthostatic hypotension           ED Disposition       ED Disposition   Admit    Condition   Stable    Date/Time   Thu May 16, 2024  8:53 PM    Comment   Case was discussed with ABBY and the patient's admission status was agreed to be Admission Status: observation status to the service of Dr. Hawkins .               Follow-up Information    None       Patient's Medications   Discharge Prescriptions    No medications on file     No discharge procedures on file.    PDMP Review       None            ED Provider  Electronically Signed by           Miguel A YEAGER  Sushil, DO  05/16/24 2020       Miguel A Ling, DO  05/16/24 2053

## 2024-05-17 ENCOUNTER — APPOINTMENT (OUTPATIENT)
Dept: NON INVASIVE DIAGNOSTICS | Facility: HOSPITAL | Age: 74
End: 2024-05-17
Payer: MEDICARE

## 2024-05-17 ENCOUNTER — APPOINTMENT (OUTPATIENT)
Dept: ULTRASOUND IMAGING | Facility: HOSPITAL | Age: 74
End: 2024-05-17
Payer: MEDICARE

## 2024-05-17 VITALS
DIASTOLIC BLOOD PRESSURE: 63 MMHG | BODY MASS INDEX: 33.43 KG/M2 | SYSTOLIC BLOOD PRESSURE: 108 MMHG | HEIGHT: 67 IN | HEART RATE: 55 BPM | WEIGHT: 212.96 LBS | OXYGEN SATURATION: 99 % | RESPIRATION RATE: 18 BRPM | TEMPERATURE: 96.8 F

## 2024-05-17 LAB
ALBUMIN SERPL BCP-MCNC: 3.8 G/DL (ref 3.5–5)
ALP SERPL-CCNC: 45 U/L (ref 34–104)
ALT SERPL W P-5'-P-CCNC: 15 U/L (ref 7–52)
ANION GAP SERPL CALCULATED.3IONS-SCNC: 7 MMOL/L (ref 4–13)
AST SERPL W P-5'-P-CCNC: 17 U/L (ref 13–39)
ATRIAL RATE: 60 BPM
BACTERIA UR QL AUTO: ABNORMAL /HPF
BASOPHILS # BLD AUTO: 0.02 THOUSANDS/ÂΜL (ref 0–0.1)
BASOPHILS NFR BLD AUTO: 0 % (ref 0–1)
BILIRUB SERPL-MCNC: 0.67 MG/DL (ref 0.2–1)
BILIRUB UR QL STRIP: NEGATIVE
BSA FOR ECHO PROCEDURE: 2.08 M2
BUN SERPL-MCNC: 23 MG/DL (ref 5–25)
CALCIUM SERPL-MCNC: 9.1 MG/DL (ref 8.4–10.2)
CHLORIDE SERPL-SCNC: 108 MMOL/L (ref 96–108)
CLARITY UR: ABNORMAL
CO2 SERPL-SCNC: 26 MMOL/L (ref 21–32)
COLOR UR: YELLOW
CREAT SERPL-MCNC: 1.44 MG/DL (ref 0.6–1.3)
EOSINOPHIL # BLD AUTO: 0.05 THOUSAND/ÂΜL (ref 0–0.61)
EOSINOPHIL NFR BLD AUTO: 1 % (ref 0–6)
ERYTHROCYTE [DISTWIDTH] IN BLOOD BY AUTOMATED COUNT: 13.3 % (ref 11.6–15.1)
GFR SERPL CREATININE-BSD FRML MDRD: 47 ML/MIN/1.73SQ M
GLUCOSE P FAST SERPL-MCNC: 93 MG/DL (ref 65–99)
GLUCOSE SERPL-MCNC: 93 MG/DL (ref 65–140)
GLUCOSE UR STRIP-MCNC: ABNORMAL MG/DL
HCT VFR BLD AUTO: 39.6 % (ref 36.5–49.3)
HGB BLD-MCNC: 12.8 G/DL (ref 12–17)
HGB UR QL STRIP.AUTO: ABNORMAL
IMM GRANULOCYTES # BLD AUTO: 0.02 THOUSAND/UL (ref 0–0.2)
IMM GRANULOCYTES NFR BLD AUTO: 0 % (ref 0–2)
KETONES UR STRIP-MCNC: NEGATIVE MG/DL
LEUKOCYTE ESTERASE UR QL STRIP: NEGATIVE
LYMPHOCYTES # BLD AUTO: 1.63 THOUSANDS/ÂΜL (ref 0.6–4.47)
LYMPHOCYTES NFR BLD AUTO: 29 % (ref 14–44)
MAGNESIUM SERPL-MCNC: 2 MG/DL (ref 1.9–2.7)
MCH RBC QN AUTO: 31.6 PG (ref 26.8–34.3)
MCHC RBC AUTO-ENTMCNC: 32.3 G/DL (ref 31.4–37.4)
MCV RBC AUTO: 98 FL (ref 82–98)
MONOCYTES # BLD AUTO: 0.65 THOUSAND/ÂΜL (ref 0.17–1.22)
MONOCYTES NFR BLD AUTO: 12 % (ref 4–12)
NEUTROPHILS # BLD AUTO: 3.19 THOUSANDS/ÂΜL (ref 1.85–7.62)
NEUTS SEG NFR BLD AUTO: 58 % (ref 43–75)
NITRITE UR QL STRIP: NEGATIVE
NON-SQ EPI CELLS URNS QL MICRO: ABNORMAL /HPF
NRBC BLD AUTO-RTO: 0 /100 WBCS
P AXIS: 55 DEGREES
PA SYSTOLIC PRESSURE: 18 MMHG
PH UR STRIP.AUTO: 6 [PH]
PHOSPHATE SERPL-MCNC: 4 MG/DL (ref 2.3–4.1)
PLATELET # BLD AUTO: 181 THOUSANDS/UL (ref 149–390)
PMV BLD AUTO: 9.7 FL (ref 8.9–12.7)
POTASSIUM SERPL-SCNC: 4.7 MMOL/L (ref 3.5–5.3)
PR INTERVAL: 242 MS
PROT SERPL-MCNC: 6 G/DL (ref 6.4–8.4)
PROT UR STRIP-MCNC: NEGATIVE MG/DL
QRS AXIS: -55 DEGREES
QRSD INTERVAL: 174 MS
QT INTERVAL: 494 MS
QTC INTERVAL: 494 MS
RBC # BLD AUTO: 4.05 MILLION/UL (ref 3.88–5.62)
RBC #/AREA URNS AUTO: ABNORMAL /HPF
SL CV LV EF: 45
SODIUM SERPL-SCNC: 141 MMOL/L (ref 135–147)
SP GR UR STRIP.AUTO: 1.02 (ref 1–1.03)
T WAVE AXIS: 17 DEGREES
UROBILINOGEN UR STRIP-ACNC: <2 MG/DL
VENTRICULAR RATE: 60 BPM
WBC # BLD AUTO: 5.56 THOUSAND/UL (ref 4.31–10.16)
WBC #/AREA URNS AUTO: ABNORMAL /HPF

## 2024-05-17 PROCEDURE — 81001 URINALYSIS AUTO W/SCOPE: CPT | Performed by: INTERNAL MEDICINE

## 2024-05-17 PROCEDURE — 85025 COMPLETE CBC W/AUTO DIFF WBC: CPT | Performed by: PHYSICIAN ASSISTANT

## 2024-05-17 PROCEDURE — 93306 TTE W/DOPPLER COMPLETE: CPT | Performed by: INTERNAL MEDICINE

## 2024-05-17 PROCEDURE — 93306 TTE W/DOPPLER COMPLETE: CPT

## 2024-05-17 PROCEDURE — 99204 OFFICE O/P NEW MOD 45 MIN: CPT | Performed by: INTERNAL MEDICINE

## 2024-05-17 PROCEDURE — 99239 HOSP IP/OBS DSCHRG MGMT >30: CPT

## 2024-05-17 PROCEDURE — 93010 ELECTROCARDIOGRAM REPORT: CPT | Performed by: INTERNAL MEDICINE

## 2024-05-17 PROCEDURE — 76775 US EXAM ABDO BACK WALL LIM: CPT

## 2024-05-17 PROCEDURE — 80053 COMPREHEN METABOLIC PANEL: CPT | Performed by: PHYSICIAN ASSISTANT

## 2024-05-17 PROCEDURE — 84100 ASSAY OF PHOSPHORUS: CPT | Performed by: PHYSICIAN ASSISTANT

## 2024-05-17 PROCEDURE — 83735 ASSAY OF MAGNESIUM: CPT | Performed by: PHYSICIAN ASSISTANT

## 2024-05-17 PROCEDURE — 36415 COLL VENOUS BLD VENIPUNCTURE: CPT | Performed by: PHYSICIAN ASSISTANT

## 2024-05-17 RX ORDER — LEVOTHYROXINE SODIUM 0.07 MG/1
TABLET ORAL
Status: COMPLETED
Start: 2024-05-17 | End: 2024-05-17

## 2024-05-17 RX ADMIN — FINASTERIDE 5 MG: 5 TABLET, FILM COATED ORAL at 08:14

## 2024-05-17 RX ADMIN — LEVOTHYROXINE SODIUM 75 MCG: 75 TABLET ORAL at 05:45

## 2024-05-17 RX ADMIN — LEVOTHYROXINE SODIUM 75 MCG: 0.07 TABLET ORAL at 05:45

## 2024-05-17 RX ADMIN — DOCUSATE SODIUM 100 MG: 100 CAPSULE, LIQUID FILLED ORAL at 08:14

## 2024-05-17 RX ADMIN — APIXABAN 5 MG: 5 TABLET, FILM COATED ORAL at 08:14

## 2024-05-17 NOTE — ASSESSMENT & PLAN NOTE
Presented to the ER via EMS for evaluation of syncope episode  Patient reports feeling lightheaded and dizzy prior to syncope episodes  CT head, CT cervical spine negative for acute findings  EKG shows a sinus rhythm at a rate of 60 beats per minute  Admit to med surg  Orthostatic vital signs  Telemetry monitoring  Check echo  Gentle IV fluids  Monitor Vital signs  OT/PT eval  Consider cardiology consult  Am labs  Supportive care

## 2024-05-17 NOTE — PHYSICAL THERAPY NOTE
PHYSICAL THERAPY        Patient Name: Prieto Tyson .  Today's Date: 5/17/2024 05/17/24 1230   PT Last Visit   PT Visit Date 05/17/24   Note Type   Note type Screen   Cancel Reasons Other       Order received and chart review performed. Per conversation with nursing staff and pt, pt is performing functional mobility independently with no device. Pt does not require skilled PT intervention at this time; will d/c PT orders.    Criss Bermeo

## 2024-05-17 NOTE — ASSESSMENT & PLAN NOTE
Presented to the ER via EMS for evaluation of syncope episode yesterday after golfing all day.   Patient reports feeling lightheaded and dizzy prior to syncopal episodes  CT head, CT cervical spine negative for acute findings  EKG shows a sinus rhythm at a rate of 60 beats per minute  ECHO: EF 45 to 50%, systolic function mildly reduced, diastolic function normal  Gentle IV fluids with improvement  Patient's BP remained labile in the ED - patient was encouraged to hold his HTN medications given KARTHIKEYAN and low blood pressures. He should follow up with his PCP prior to restarting these medications.   F/u outpatient with cardiology

## 2024-05-17 NOTE — ASSESSMENT & PLAN NOTE
Blood pressure initially low upon arrival to the ER  Currently stable  Will hold BP medications for now  Monitor blood pressure closely  Can possibly resume home blood pressure medication in the a.m.

## 2024-05-17 NOTE — CONSULTS
Consultation - Cardiology   Prieto Tyson Sr. 74 y.o. male MRN: [id number]  Unit/Bed#: RM22 Encounter: [id number]  Physician Requesting Consult: Enrrique Hawkins DO  Reason for Consult / Principal Problem: Syncope    Assessment:  Syncope  PAF  Cardiomyopathy    Plan:  His episode is c/w syncope due to hypovolemia after playing 18 holes of golf. His BP runs on the low normal side.  He is stable from a cardiac standpoint. Will order 500 cc NSS but OK to d/c after that from a cardiac standpoint and follow up with his [facility] cardiogolist.    History of Present Illness     HPI: Prieto Tyson Sr. is a 74 y.o. year old male who follows regularly with a cardiologist in the [facility] system.  He has PAF and underwent cardioversion in 12/2023. He is supposed to get an AF ablation in the near future.  He has had episodes of lightheadedness when standing and his BP has been running on the low normal side.  Yesterday, after playing 18 holes of golf, he stood up to go to the BR and felt very dizzy and then passed out. This was witnessed and there was no seizure activity seen.  He had one similar episode in the past.    He is usually active without CP, SOB, palpitations.      ECHO - EF 45 - 50%    ECG - NSR, RBBB ( not new )      Creatinine 1.98 - down to 1.44 with IVFs  BUN 27 down to 23     Home meds -   Proscar 5 mg daily  Toprol XL 50 mg daily  Flomax 0.4 mg daily        Review of Systems:    Alert awake oriented, comfortable, denies any complaints  No fevers chills nausea vomiting  No weakness, dizziness, seizures  no cough, shortness of breath, or wheezing  Denies any palpitations, chest pain, diaphoresis  Denies leg edema, pain or paresthesias  Denies any skin rashes  Denies abdominal pain, bloody stools, masses  Denies any depression or suicidal ideations      Historical Information   Past Medical History:   Diagnosis Date    Cardiac angina (HCC)     EF of 25%    Coronary artery disease     Hypertension   "    History reviewed. No pertinent surgical history.  Social History     Substance and Sexual Activity   Alcohol Use Not Currently     Social History     Substance and Sexual Activity   Drug Use Not Currently     Social History     Tobacco Use   Smoking Status Never    Passive exposure: Never   Smokeless Tobacco Never     Family History: non-contributory    Meds/Allergies   all current active meds have been reviewed  Allergies   Allergen Reactions    Amoxicillin Hives     Thrush       Objective   Vitals: Blood pressure 108/63, pulse 55, temperature (!) 96.8 °F (36 °C), temperature source Temporal, resp. rate 18, height 5' 7\" (1.702 m), weight 96.6 kg (212 lb 15.4 oz), SpO2 99%., Body mass index is 33.35 kg/m².,   Orthostatic Blood Pressures      Flowsheet Row Most Recent Value   Blood Pressure 108/63 filed at 05/17/2024 1126   Patient Position - Orthostatic VS Lying filed at 05/17/2024 1126              Intake/Output Summary (Last 24 hours) at 5/17/2024 1213  Last data filed at 5/17/2024 0601  Gross per 24 hour   Intake 2000 ml   Output 600 ml   Net 1400 ml               Physical Exam:  GEN: Prieto Tyson Sr. appears well, alert and oriented x 3, pleasant and cooperative   HEENT: pupils equal, round, and reactive to light; extraocular muscles intact  NECK: supple, no carotid bruits   HEART: regular rhythm, normal S1 and S2, no murmurs, clicks, gallops or rubs   LUNGS: clear to auscultation bilaterally; no wheezes, rales, or rhonchi   ABDOMEN: normal bowel sounds, soft, no tenderness, no distention  EXTREMITIES: peripheral pulses normal; no clubbing, cyanosis, or edema  NEURO: no focal findings   SKIN: normal without suspicious lesions on exposed skin    Lab Results:   Admission on 05/16/2024   Component Date Value Ref Range Status    Ventricular Rate 05/16/2024 60  BPM Incomplete    Atrial Rate 05/16/2024 60  BPM Incomplete    WV Interval 05/16/2024 242  ms Incomplete    QRSD Interval 05/16/2024 174  ms Incomplete    QT " Interval 05/16/2024 494  ms Incomplete    QTC Interval 05/16/2024 494  ms Incomplete    P Axis 05/16/2024 55  degrees Incomplete    QRS Axis 05/16/2024 -55  degrees Incomplete    T Wave Axis 05/16/2024 17  degrees Incomplete    ABO Grouping 05/16/2024 A   Final    Rh Factor 05/16/2024 Positive   Final    Antibody Screen 05/16/2024 Negative   Final    Specimen Expiration Date 05/16/2024 20240519   Final    WBC 05/16/2024 7.54  4.31 - 10.16 Thousand/uL Final    RBC 05/16/2024 4.24  3.88 - 5.62 Million/uL Final    Hemoglobin 05/16/2024 13.6  12.0 - 17.0 g/dL Final    Hematocrit 05/16/2024 41.3  36.5 - 49.3 % Final    MCV 05/16/2024 97  82 - 98 fL Final    MCH 05/16/2024 32.1  26.8 - 34.3 pg Final    MCHC 05/16/2024 32.9  31.4 - 37.4 g/dL Final    RDW 05/16/2024 13.2  11.6 - 15.1 % Final    MPV 05/16/2024 10.0  8.9 - 12.7 fL Final    Platelets 05/16/2024 218  149 - 390 Thousands/uL Final    nRBC 05/16/2024 0  /100 WBCs Final    Segmented % 05/16/2024 75  43 - 75 % Final    Immature Grans % 05/16/2024 1  0 - 2 % Final    Lymphocytes % 05/16/2024 15  14 - 44 % Final    Monocytes % 05/16/2024 9  4 - 12 % Final    Eosinophils Relative 05/16/2024 0  0 - 6 % Final    Basophils Relative 05/16/2024 0  0 - 1 % Final    Absolute Neutrophils 05/16/2024 5.65  1.85 - 7.62 Thousands/µL Final    Absolute Immature Grans 05/16/2024 0.04  0.00 - 0.20 Thousand/uL Final    Absolute Lymphocytes 05/16/2024 1.09  0.60 - 4.47 Thousands/µL Final    Absolute Monocytes 05/16/2024 0.71  0.17 - 1.22 Thousand/µL Final    Eosinophils Absolute 05/16/2024 0.03  0.00 - 0.61 Thousand/µL Final    Basophils Absolute 05/16/2024 0.02  0.00 - 0.10 Thousands/µL Final    Sodium 05/16/2024 139  135 - 147 mmol/L Final    Potassium 05/16/2024 4.7  3.5 - 5.3 mmol/L Final    Chloride 05/16/2024 107  96 - 108 mmol/L Final    CO2 05/16/2024 25  21 - 32 mmol/L Final    ANION GAP 05/16/2024 7  4 - 13 mmol/L Final    BUN 05/16/2024 27 (H)  5 - 25 mg/dL Final     "Creatinine 05/16/2024 1.98 (H)  0.60 - 1.30 mg/dL Final    Standardized to IDMS reference method    Glucose 05/16/2024 119  65 - 140 mg/dL Final    If the patient is fasting, the ADA then defines impaired fasting glucose as > 100 mg/dL and diabetes as > or equal to 123 mg/dL.    Calcium 05/16/2024 9.2  8.4 - 10.2 mg/dL Final    AST 05/16/2024 22  13 - 39 U/L Final    ALT 05/16/2024 17  7 - 52 U/L Final    Specimen collection should occur prior to Sulfasalazine administration due to the potential for falsely depressed results.     Alkaline Phosphatase 05/16/2024 47  34 - 104 U/L Final    Total Protein 05/16/2024 6.6  6.4 - 8.4 g/dL Final    Albumin 05/16/2024 4.0  3.5 - 5.0 g/dL Final    Total Bilirubin 05/16/2024 0.50  0.20 - 1.00 mg/dL Final    Use of this assay is not recommended for patients undergoing treatment with eltrombopag due to the potential for falsely elevated results.  N-acetyl-p-benzoquinone imine (metabolite of Acetaminophen) will generate erroneously low results in samples for patients that have taken an overdose of Acetaminophen.    eGFR 05/16/2024 32  ml/min/1.73sq m Final    Protime 05/16/2024 16.5 (H)  11.6 - 14.5 seconds Final    INR 05/16/2024 1.35 (H)  0.84 - 1.19 Final    PTT 05/16/2024 28  23 - 37 seconds Final    Therapeutic Heparin Range =  60-90 seconds    hs TnI 0hr 05/16/2024 6  \"Refer to ACS Flowchart\"- see link ng/L Final    Comment:                                              Initial (time 0) result  If >=50 ng/L, Myocardial injury suggested ;  Type of myocardial injury and treatment strategy  to be determined.  If 5-49 ng/L, a delta result at 2 hours and or 4 hours will be needed to further evaluate.  If <4 ng/L, and chest pain has been >3 hours since onset, patient may qualify for discharge based on the HEART score in the ED.  If <5 ng/L and <3hours since onset of chest pain, a delta result at 2 hours will be needed to further evaluate.    HS Troponin 99th Percentile URL of a " "Health Population=12 ng/L with a 95% Confidence Interval of 8-18 ng/L.    Second Troponin (time 2 hours)  If calculated delta >= 20 ng/L,  Myocardial injury suggested ; Type of myocardial injury and treatment strategy to be determined.  If 5-49 ng/L and the calculated delta is 5-19 ng/L, consult medical service for evaluation.  Continue evaluation for ischemia on ecg and other possible etiology and repeat hs troponin at 4 hours.  If delta                            is <5 ng/L at 2 hours, consider discharge based on risk stratification via the HEART score (if in ED), or KACI risk score in IP/Observation.    HS Troponin 99th Percentile URL of a Health Population=12 ng/L with a 95% Confidence Interval of 8-18 ng/L.    BNP 05/16/2024 63  0 - 100 pg/mL Final    ABO Grouping 05/16/2024 A   Final    Rh Factor 05/16/2024 Positive   Final    hs TnI 2hr 05/16/2024 5  \"Refer to ACS Flowchart\"- see link ng/L Final    Comment:                                              Initial (time 0) result  If >=50 ng/L, Myocardial injury suggested ;  Type of myocardial injury and treatment strategy  to be determined.  If 5-49 ng/L, a delta result at 2 hours and or 4 hours will be needed to further evaluate.  If <4 ng/L, and chest pain has been >3 hours since onset, patient may qualify for discharge based on the HEART score in the ED.  If <5 ng/L and <3hours since onset of chest pain, a delta result at 2 hours will be needed to further evaluate.    HS Troponin 99th Percentile URL of a Health Population=12 ng/L with a 95% Confidence Interval of 8-18 ng/L.    Second Troponin (time 2 hours)  If calculated delta >= 20 ng/L,  Myocardial injury suggested ; Type of myocardial injury and treatment strategy to be determined.  If 5-49 ng/L and the calculated delta is 5-19 ng/L, consult medical service for evaluation.  Continue evaluation for ischemia on ecg and other possible etiology and repeat hs troponin at 4 hours.  If delta                       " "     is <5 ng/L at 2 hours, consider discharge based on risk stratification via the HEART score (if in ED), or KACI risk score in IP/Observation.    HS Troponin 99th Percentile URL of a Health Population=12 ng/L with a 95% Confidence Interval of 8-18 ng/L.    Delta 2hr hsTnI 05/16/2024 -1  <20 ng/L Final    hs TnI 4hr 05/16/2024 4  \"Refer to ACS Flowchart\"- see link ng/L Final    Comment:                                              Initial (time 0) result  If >=50 ng/L, Myocardial injury suggested ;  Type of myocardial injury and treatment strategy  to be determined.  If 5-49 ng/L, a delta result at 2 hours and or 4 hours will be needed to further evaluate.  If <4 ng/L, and chest pain has been >3 hours since onset, patient may qualify for discharge based on the HEART score in the ED.  If <5 ng/L and <3hours since onset of chest pain, a delta result at 2 hours will be needed to further evaluate.    HS Troponin 99th Percentile URL of a Health Population=12 ng/L with a 95% Confidence Interval of 8-18 ng/L.    Second Troponin (time 2 hours)  If calculated delta >= 20 ng/L,  Myocardial injury suggested ; Type of myocardial injury and treatment strategy to be determined.  If 5-49 ng/L and the calculated delta is 5-19 ng/L, consult medical service for evaluation.  Continue evaluation for ischemia on ecg and other possible etiology and repeat hs troponin at 4 hours.  If delta                            is <5 ng/L at 2 hours, consider discharge based on risk stratification via the HEART score (if in ED), or KACI risk score in IP/Observation.    HS Troponin 99th Percentile URL of a Health Population=12 ng/L with a 95% Confidence Interval of 8-18 ng/L.    Delta 4hr hsTnI 05/16/2024 -2  <20 ng/L Final    Sodium 05/17/2024 141  135 - 147 mmol/L Final    Potassium 05/17/2024 4.7  3.5 - 5.3 mmol/L Final    Chloride 05/17/2024 108  96 - 108 mmol/L Final    CO2 05/17/2024 26  21 - 32 mmol/L Final    ANION GAP 05/17/2024 7  4 - 13 " mmol/L Final    BUN 05/17/2024 23  5 - 25 mg/dL Final    Creatinine 05/17/2024 1.44 (H)  0.60 - 1.30 mg/dL Final    Standardized to IDMS reference method    Glucose 05/17/2024 93  65 - 140 mg/dL Final    If the patient is fasting, the ADA then defines impaired fasting glucose as > 100 mg/dL and diabetes as > or equal to 123 mg/dL.    Glucose, Fasting 05/17/2024 93  65 - 99 mg/dL Final    Calcium 05/17/2024 9.1  8.4 - 10.2 mg/dL Final    AST 05/17/2024 17  13 - 39 U/L Final    ALT 05/17/2024 15  7 - 52 U/L Final    Specimen collection should occur prior to Sulfasalazine administration due to the potential for falsely depressed results.     Alkaline Phosphatase 05/17/2024 45  34 - 104 U/L Final    Total Protein 05/17/2024 6.0 (L)  6.4 - 8.4 g/dL Final    Albumin 05/17/2024 3.8  3.5 - 5.0 g/dL Final    Total Bilirubin 05/17/2024 0.67  0.20 - 1.00 mg/dL Final    Use of this assay is not recommended for patients undergoing treatment with eltrombopag due to the potential for falsely elevated results.  N-acetyl-p-benzoquinone imine (metabolite of Acetaminophen) will generate erroneously low results in samples for patients that have taken an overdose of Acetaminophen.    eGFR 05/17/2024 47  ml/min/1.73sq m Final    Magnesium 05/17/2024 2.0  1.9 - 2.7 mg/dL Final    Phosphorus 05/17/2024 4.0  2.3 - 4.1 mg/dL Final    WBC 05/17/2024 5.56  4.31 - 10.16 Thousand/uL Final    RBC 05/17/2024 4.05  3.88 - 5.62 Million/uL Final    Hemoglobin 05/17/2024 12.8  12.0 - 17.0 g/dL Final    Hematocrit 05/17/2024 39.6  36.5 - 49.3 % Final    MCV 05/17/2024 98  82 - 98 fL Final    MCH 05/17/2024 31.6  26.8 - 34.3 pg Final    MCHC 05/17/2024 32.3  31.4 - 37.4 g/dL Final    RDW 05/17/2024 13.3  11.6 - 15.1 % Final    MPV 05/17/2024 9.7  8.9 - 12.7 fL Final    Platelets 05/17/2024 181  149 - 390 Thousands/uL Final    nRBC 05/17/2024 0  /100 WBCs Final    Segmented % 05/17/2024 58  43 - 75 % Final    Immature Grans % 05/17/2024 0  0 - 2 % Final     Lymphocytes % 05/17/2024 29  14 - 44 % Final    Monocytes % 05/17/2024 12  4 - 12 % Final    Eosinophils Relative 05/17/2024 1  0 - 6 % Final    Basophils Relative 05/17/2024 0  0 - 1 % Final    Absolute Neutrophils 05/17/2024 3.19  1.85 - 7.62 Thousands/µL Final    Absolute Immature Grans 05/17/2024 0.02  0.00 - 0.20 Thousand/uL Final    Absolute Lymphocytes 05/17/2024 1.63  0.60 - 4.47 Thousands/µL Final    Absolute Monocytes 05/17/2024 0.65  0.17 - 1.22 Thousand/µL Final    Eosinophils Absolute 05/17/2024 0.05  0.00 - 0.61 Thousand/µL Final    Basophils Absolute 05/17/2024 0.02  0.00 - 0.10 Thousands/µL Final    BSA 05/17/2024 2.08  m2 Final    LV EF 05/17/2024 45   Final    PASP 05/17/2024 18.0  mmHg Final    Color, UA 05/17/2024 Yellow   Final    Clarity, UA 05/17/2024 Slightly Cloudy   Final    Specific Gravity, UA 05/17/2024 1.020  1.005 - 1.030 Final    pH, UA 05/17/2024 6.0  4.5, 5.0, 5.5, 6.0, 6.5, 7.0, 7.5, 8.0 Final    Leukocytes, UA 05/17/2024 Negative  Negative Final    Nitrite, UA 05/17/2024 Negative  Negative Final    Protein, UA 05/17/2024 Negative  Negative mg/dl Final    Glucose, UA 05/17/2024 1000 (1%) (A)  Negative mg/dl Final    Elevated glucose may cause false negative leukocyte esterase    Ketones, UA 05/17/2024 Negative  Negative mg/dl Final    Urobilinogen, UA 05/17/2024 <2.0  <2.0 mg/dl mg/dl Final    Bilirubin, UA 05/17/2024 Negative  Negative Final    Occult Blood, UA 05/17/2024 Large (A)  Negative Final    RBC, UA 05/17/2024 Innumerable (A)  None Seen, 2-4 /hpf Final    WBC, UA 05/17/2024 4-10 (A)  None Seen, 2-4, 5-60 /hpf Final    Epithelial Cells 05/17/2024 Occasional  None Seen, Occasional /hpf Final    Bacteria, UA 05/17/2024 Occasional  None Seen, Occasional /hpf Final           Results from last 7 days   Lab Units 05/17/24  0550 05/16/24  1840   WBC Thousand/uL 5.56 7.54   HEMOGLOBIN g/dL 12.8 13.6   HEMATOCRIT % 39.6 41.3   PLATELETS Thousands/uL 181 218         Results from  last 7 days   Lab Units 05/17/24  0550 05/16/24  1840   POTASSIUM mmol/L 4.7 4.7   CHLORIDE mmol/L 108 107   CO2 mmol/L 26 25   BUN mg/dL 23 27*   CREATININE mg/dL 1.44* 1.98*   CALCIUM mg/dL 9.1 9.2   ALK PHOS U/L 45 47   ALT U/L 15 17   AST U/L 17 22     Results from last 7 days   Lab Units 05/16/24  1840   INR  1.35*         Imaging: I have personally reviewed pertinent reports.                          Counseling / Coordination of Care  Total floor / unit time spent today 60 minutes.  Greater than 50% of total time was spent with the patient and / or family counseling and / or coordination of care.

## 2024-05-17 NOTE — ASSESSMENT & PLAN NOTE
Creatinine levels at 1.98  Baseline appears to be between 0.9 and 1.1  Received IV fluids in the ER  Avoid nephrotoxic agents  Continue gentle IV fluids, improving Cr   Monitor urinary output, electrolytes, creatinine levels.  Cr downternding   Nephrology consult  Likely prerenal - unclear if combination of aldactone, lisinopril and jardiance contributing to volume depletion   Flomax w/ side effect of hypotension   Renal US ordered, bladder scan  Orthostatic VS

## 2024-05-17 NOTE — ED NOTES
Rounding completed on pt   offers no needs or concerns at this time   instructed to use his call bell if he needs anything   verbalized underst  Deedee Richmond RN  05/16/24 [id number]       Deedee Richmond RN  05/17/24 [id number]

## 2024-05-17 NOTE — CASE MANAGEMENT
Case Management Progress Note    Patient name Prieto Tyson Sr.  Location 22/RM22 MRN [id number]  : 1950 Date 2024       LOS (days): 0  Geometric Mean LOS (GMLOS) (days):   Days to GMLOS:        OBJECTIVE:        Current admission status: Observation  Preferred Pharmacy:   [id number] [address] [address]  Phone: [phone] Fax: [phone]    Primary Care Provider: Rhoda Castillo MD    Primary Insurance: MEDICARE  Secondary Insurance: [facility] [place] [employer]    PROGRESS NOTE:chart reviewed, no current discharge needs. No needs noted from therapy recommendations. Cm to follow.

## 2024-05-17 NOTE — CONSULTS
Consultation - Nephrology   Prieto Tyson . 74 y.o. male MRN: [id number]  Unit/Bed#: RM22 Encounter: [id number]        Pertinent labs and radiologic studies: CT of the chest abdomen and pelvis with contrast shows no hydronephrosis.  There are numerous bilateral renal cysts.  There is diverticulosis noted.  Creatinine 1.4 on May 17.  Total protein 6.0, phosphorus 4.  CO2 26.  , hemoglobin 12.8 platelets 181.  Urine studies pending.      Assessment & Plan   Acute renal failure/acute kidney injury present on admission: This is likely secondary to prerenal azotemia given improvement in creatinine with IV fluids with creatinine levels improving from 1.9-1.4.  The patient does have a history of ejection fraction of 20 to 25% and has been on guideline directed medical therapy including Aldactone, lisinopril and Jardiance as an outpatient.  I am not certain how long the patient has been on Jardiance and if any of these medications are new but the combination of the Aldactone and Jardiance may have contributed to volume depletion.  The patient is also on Flomax with a history of BPH for the patient and this may also have contributed to orthostatic hypotension.  CT the abdomen pelvis shows no evidence of any hydronephrosis and the multiple cysts noted.  Plan on checking dedicated renal ultrasound and bladder scan PVR especially with history of BPH with ablation a couple of years ago and just assess that.  Agree with orthostatic vital signs.  Decrease IV fluids to 40 cc an hour.  Cautious IV hydration and patient with a history of ejection fraction 25%.  Follow daily weights and check weight today.  Check urinalysis.    2.  History of EF of 20 to 25%: Recheck echocardiogram and see if there is an improvement in EF.  May need some modification of guideline directed medical therapy especially with Aldactone and Jardiance if patient continues to have lightheadedness and dizziness despite being on guideline directed medical  therapy.  Not sure if this is contributing as well to patient's symptoms.           VIRTUAL CARE DOCUMENTATION:     1. This service was provided via Telemedicine using [employer]     2. Parties in the room with patient during teleconsult Patient only    3. Confidentiality My office door was closed     4. Participants No one else was in the room    5. Patient acknowledged consent and understanding of privacy and security of the  Telemedicine consult. I informed the patient that I have reviewed their record in Epic and presented the opportunity for them to ask any questions regarding the visit today.  The patient agreed to participate.    6. Time spent including interview with the patient is well as review of the patient's chart, medical record radiologic studies and creation of assessment and plan total time approximately 45 minutes.              History of Present Illness   Physician Requesting Consult: Enrrique Hawkins DO  Reason for Consult / Principal Problem: KARTHIKEYAN  Hx and PE limited by:   HPI: Prieto Tyson Sr. is a 74 y.o. year old male who presents with dizziness and lightheadedness.  I had the opportunity to see and speak with Mr. Tyson in the room via a telehealth medium.  He states that he had played 18 holes of golf the day prior and during that time he was actually feeling better.  He states that he was sitting down waiting for dinner.  He had gotten up to use the bathroom and after getting up from the toilet he had dizziness and had lost consciousness.  He was attended by an EMT and nurse at the [facility].  He states sometimes when he is at home he gets up and he has some transient lightheadedness.  He related to me his cardiac history including history of atrial fibrillation for which he had a cardioversion in December.  He states he was blood pressure used to run high it now once on the lower side.  He also had mentioned he had been losing weight but intentionally as his prior weight has been as high  as 255 pounds and he had been 215 pounds recently again the weight loss being intentional..  When the patient presented his creatinine was 1.9 and at that time he was started on IV fluids.  When I had the opportunity to see the patient this morning repeat labs were back and his creatinine was 1.4.  He denies any issues with urinating as he has a history of BPH and underwent an ablation procedure with urology and follows with urology.  He denies any trouble with flow or stream.  He denies any recent illness no nausea vomiting, diarrhea no cough no fevers or chills recently.  We are asked to see the patient for evaluation of acute kidney injury.  Past medical history includes EF of 25%, CAD and hypertension.  Other past medical history includes a prior history of B-cell lymphoma in which the patient is seeing hematology and follow-up.  EKG was reported to show normal sinus rhythm.  The patient in emergency room received 1 L IV fluid bolus.  History obtained from the patient    Consults    General: Patient noted increased dizziness and lightheadedness on presentation however this morning is feeling better however the patient was supine at the time of presentation.  Cardiovascular: He has a history of atrial fibrillation but denies any palpitations, no chest pressure or shortness of breath.  Respiratory: He denies any cough or hemoptysis.  Gastrointestinal: He denies any nausea, vomiting or diarrhea.  Genitourinary: Of note above he has a history of BPH but denies any urgency, frequency, no initiation of flow or stream.  Outside of the above review of systems, all others are essentially negative    Historical Information   Past Medical History:   Diagnosis Date    Cardiac angina (HCC)     EF of 25%    Coronary artery disease     Hypertension      History reviewed. No pertinent surgical history.  Social History   Social History     Substance and Sexual Activity   Alcohol Use Not Currently     Social History     Substance  and Sexual Activity   Drug Use Not Currently     Social History     Tobacco Use   Smoking Status Never    Passive exposure: Never   Smokeless Tobacco Never     History reviewed. No pertinent family history.    Meds/Allergies   all current active meds have been reviewed, current meds:   Current Facility-Administered Medications   Medication Dose Route Frequency    acetaminophen (TYLENOL) tablet 650 mg  650 mg Oral Q6H PRN    apixaban (ELIQUIS) tablet 5 mg  5 mg Oral BID    atorvastatin (LIPITOR) tablet 40 mg  40 mg Oral Daily With Dinner    docusate sodium (COLACE) capsule 100 mg  100 mg Oral BID    finasteride (PROSCAR) tablet 5 mg  5 mg Oral Daily    levothyroxine tablet 75 mcg  75 mcg Oral Early Morning    metoprolol succinate (TOPROL-XL) 24 hr tablet 50 mg  50 mg Oral Daily    ondansetron (ZOFRAN) injection 4 mg  4 mg Intravenous Q6H PRN    sodium chloride 0.9 % infusion  50 mL/hr Intravenous Continuous    tamsulosin (FLOMAX) capsule 0.4 mg  0.4 mg Oral Daily With Dinner    and PTA meds:  Not in a hospital admission.    Allergies   Allergen Reactions    Amoxicillin Hives     Thrush       Objective     Intake/Output Summary (Last 24 hours) at 5/17/2024 0704  Last data filed at 5/17/2024 0601  Gross per 24 hour   Intake 2000 ml   Output 600 ml   Net 1400 ml       Invasive Devices:        General: patient in NAD patient is resting supine and is not in any acute distress.  Skin: No new rash.  Eyes: There is no scleral icterus.  ENT: Mildly dry mucous membranes.  Neck: Supple  Chest: No accessory muscle use noted no conversational dyspnea.  CVS: In reviewing vital signs patient is noted to have a pulse in the 50s  Extremities: There is no significant edema noted.  Neuro: Neurologic exam shows no focal deficits.  Psych: The patient is able to answer questions appropriately.    Current Weight:    First Weight:      Lab Results:  I have personally reviewed pertinent labs.  CBC:   Lab Results   Component Value Date    WBC  "5.56 05/17/2024    RBC 4.05 05/17/2024     CMP:   Lab Results   Component Value Date     05/17/2024     04/26/2024    CO2 26 05/17/2024    CO2 26 04/26/2024    BUN 23 05/17/2024    BUN 20 04/26/2024    CREATININE 1.44 (H) 05/17/2024    CREATININE 1.21 04/26/2024    CALCIUM 9.1 05/17/2024    CALCIUM 9.4 04/26/2024    AST 17 05/17/2024    AST 21 04/26/2024    ALT 15 05/17/2024    ALT 21 04/26/2024    ALKPHOS 45 05/17/2024    ALKPHOS 46 04/26/2024    EGFR 47 05/17/2024    EGFR 62 04/26/2024    EGFR 89 08/31/2020     Phosphorus:   Lab Results   Component Value Date    PHOS 4.0 05/17/2024     Magnesium:   Lab Results   Component Value Date    MG 2.0 05/17/2024    MG 1.7 11/28/2019     Urinalysis: No results found for: \"COLORU\", \"CLARITYU\", \"SPECGRAV\", \"PHUR\", \"LEUKOCYTESUR\", \"NITRITE\", \"PROTEINUA\", \"GLUCOSEU\", \"KETONESU\", \"BILIRUBINUR\", \"BLOODU\"  BMP:   Lab Results   Component Value Date    SODIUM 141 05/17/2024    SODIUM 143 04/26/2024    CO2 26 05/17/2024    CO2 26 04/26/2024    BUN 23 05/17/2024    BUN 20 04/26/2024    CREATININE 1.44 (H) 05/17/2024    CREATININE 1.21 04/26/2024    CALCIUM 9.1 05/17/2024    CALCIUM 9.4 04/26/2024        "

## 2024-05-17 NOTE — DISCHARGE INSTR - AVS FIRST PAGE
Dear Prieto Tyson Sr.,     It was our pleasure to care for you here at [facility].  It is our hope that we were always able to meet and exceed the expected standards for your care during your stay.  You were hospitalized due to syncopal episode .  You were cared for on the 1st floor under the service of Carey Tyler PA-C with the [facility] who covers for your primary care physician (PCP), Rhoda Castillo MD, while you were hospitalized.  If you have any questions or concerns related to this hospitalization, you may contact us at .  For follow up, we recommend that you follow up with your primary care physician.  Please review this entire discharge summary as additional general instructions may be provided later as well.  However, at this time we provide for you here, the most important instructions / recommendations at discharge:     Continue holding your lisinopril, and spironolactone on discharge   I have ordered blood work (BMP) in 1 week to ensure complete resolution of creatinine  Continue metoprolol 50 mg daily  Cardiology recommending follow-up with your cardiologist in the outpatient setting  Please follow up with PCP prior to restarting BP medications      Sincerely,     Carey Tyler PA-C

## 2024-05-17 NOTE — H&P
[facility]  H&P  Name: Prieto Tyson Sr. 74 y.o. male I MRN: [id number]  Unit/Bed#: RM22 I Date of Admission: 5/16/2024   Date of Service: 5/17/2024 I Hospital Day: 0      Assessment & Plan   * Syncope and collapse  Assessment & Plan  Presented to the ER via EMS for evaluation of syncope episode  Patient reports feeling lightheaded and dizzy prior to syncope episodes  CT head, CT cervical spine negative for acute findings  EKG shows a sinus rhythm at a rate of 60 beats per minute  Admit to med surg  Orthostatic vital signs  Telemetry monitoring  Check echo  Gentle IV fluids  Monitor Vital signs  OT/PT eval  Consider cardiology consult  Am labs  Supportive care     KARTHIKEYAN (acute kidney injury) (HCC)  Assessment & Plan  Creatinine levels at 1.98  Baseline appears to be between 0.9 and 1.1  Received IV fluids in the ER  --Avoid nephrotoxic agents  --Continue gentle IV fluids  --Monitor urinary output, electrolytes, creatinine levels.  --Check a.m. CMP  --Nephrology consult    Essential hypertension  Assessment & Plan  Blood pressure initially low upon arrival to the ER  Currently stable  Will hold BP medications for now  Monitor blood pressure closely  Can possibly resume home blood pressure medication in the a.m.             VTE Pharmacologic Prophylaxis:   Moderate Risk (Score 3-4) - Pharmacological DVT Prophylaxis Ordered: apixaban (Eliquis).  Code Status: Level 1 - Full Code level 1 Full code   Discussion with family: Updated  (son) at bedside.    Anticipated Length of Stay: Patient will be admitted on an observation basis with an anticipated length of stay of less than 2 midnights secondary to Syncope, KARTHIKEYAN.    Total Time Spent on Date of Encounter in care of patient: 40 mins. This time was spent on one or more of the following: performing physical exam; counseling and coordination of care; obtaining or reviewing history; documenting in the medical record; reviewing/ordering tests,  medications or procedures; communicating with other healthcare professionals and discussing with patient's family/caregivers.    Chief Complaint: Syncope    History of Present Illness:  Priteo Tyson Sr. is a 74 y.o. male with a PMH of CAD, hypertension, atrial fibrillation who presents to the emergency room via EMS for evaluation after reported syncope episode.  Patient reported that he has been feeling lightheaded and dizzy throughout the day.  He states that he was outside most of the day today playing golf.  Patient states that he went to the clubhouse to eat and sat at a bar and went to use the restroom.  He states after getting up off of the toilet he felt very lightheaded and dizzy apparently fell to the ground.  He states that he was initially attended to by an EMT who was present at the [facility].  Patient denies having any chest pain, shortness of breath, nausea, vomiting, diarrhea, abdominal pain and also denies having any flank pain, speech difficulty or any type of neurological weakness.    Workup in the emergency room included labs significant for BUN of 27, creatinine of 1.98, troponin negative, BNP of 63, PT of 16.5, INR of 1.35, PTT of 28.  CT head, CT cervical spine, CT chest abdomen and pelvis completed with results as shown below.  EKG shows a sinus rhythm at a rate of 60 bpm.  While in the emergency room patient received normal saline bolus 1 L.    Patient is being admitted on observation status [facility] for further workup and management of syncope episode, acute kidney injury.    Review of Systems:  Review of Systems   Constitutional:  Positive for activity change. Negative for chills, fatigue and fever.   Eyes:  Negative for photophobia and visual disturbance.   Respiratory:  Positive for shortness of breath. Negative for cough, chest tightness and wheezing.    Cardiovascular:  Negative for chest pain, palpitations and leg swelling.   Gastrointestinal:  Negative for abdominal pain,  constipation, diarrhea, nausea and vomiting.   Genitourinary:  Negative for difficulty urinating and dysuria.   Musculoskeletal:  Negative for back pain, gait problem, neck pain and neck stiffness.   Skin:  Negative for rash and wound.   Neurological:  Positive for dizziness, syncope and light-headedness. Negative for tremors, weakness and headaches.   Psychiatric/Behavioral:  Negative for agitation and confusion. The patient is not nervous/anxious.        Past Medical and Surgical History:   Past Medical History:   Diagnosis Date    Cardiac angina (HCC)     EF of 25%    Coronary artery disease     Hypertension        History reviewed. No pertinent surgical history.    Meds/Allergies:  Prior to Admission medications    Medication Sig Start Date End Date Taking? Authorizing Provider   apixaban (ELIQUIS) 5 mg Apixaban Oral  PO 1.0  BID    active 9/5/23   Historical Provider, MD   Docusate Sodium 100 MG capsule Docusate Sodium Oral     1 bid    active    Historical Provider, MD   Empagliflozin (Jardiance) 25 MG TABS Take by mouth every morning    Historical Provider, MD   finasteride (PROSCAR) 5 mg tablet Finasteride Oral (Proscar)     1 a day   inactive    Historical Provider, MD   levothyroxine 75 mcg tablet Levothyroxine Oral    QD    inactive    Historical Provider, MD   lisinopril (ZESTRIL) 5 mg tablet Lisinopril Oral     once daily   active    Historical Provider, MD   metoprolol succinate (TOPROL-XL) 50 mg 24 hr tablet Metoprolol Oral 24 hr Tab (Succinate)    mg BID    active    Historical Provider, MD   rosuvastatin (CRESTOR) 20 MG tablet Rosuvastatin Calcium Oral     1 daily  in the am   active    Historical Provider, MD   spironolactone (ALDACTONE) 25 mg tablet Take 25 mg by mouth daily    Historical Provider, MD   tamsulosin (FLOMAX) 0.4 mg Tamsulosin Oral     1 x a day half hr after supper   inactive    Historical Provider, MD GONSALES have reviewed home medications using recent Epic encounter.    Allergies:    Allergies   Allergen Reactions    Amoxicillin Hives     Thrush       Social History:  Marital Status: /Civil Union   Occupation: Retired   Patient Pre-hospital Living Situation: Home  Patient Pre-hospital Level of Mobility: walks  Patient Pre-hospital Diet Restrictions: None reported   Substance Use History:   Social History     Substance and Sexual Activity   Alcohol Use Not Currently     Social History     Tobacco Use   Smoking Status Never    Passive exposure: Never   Smokeless Tobacco Never     Social History     Substance and Sexual Activity   Drug Use Not Currently       Family History:  History reviewed. No pertinent family history.    Physical Exam:     Vitals:   Blood Pressure: 144/65 (05/17/24 0345)  Pulse: 58 (05/17/24 0345)  Temperature: (!) 97.1 °F (36.2 °C) (05/16/24 1827)  Temp Source: Temporal (05/16/24 1827)  Respirations: (!) 41 (05/17/24 0345)  SpO2: 99 % (05/17/24 0345)    Physical Exam  Constitutional:       General: He is not in acute distress.     Appearance: He is not ill-appearing.   HENT:      Head: Normocephalic and atraumatic.      Mouth/Throat:      Mouth: Mucous membranes are moist.      Pharynx: Oropharynx is clear.   Eyes:      General:         Right eye: No discharge.      Pupils: Pupils are equal, round, and reactive to light.   Cardiovascular:      Rate and Rhythm: Regular rhythm. Bradycardia present.      Pulses: Normal pulses.   Pulmonary:      Effort: No respiratory distress.      Breath sounds: No stridor. No wheezing, rhonchi or rales.   Abdominal:      General: There is no distension.      Palpations: There is no mass.      Tenderness: There is no abdominal tenderness.   Musculoskeletal:      Cervical back: Normal range of motion and neck supple.      Right lower leg: No edema.      Left lower leg: No edema.   Skin:     General: Skin is warm and dry.      Capillary Refill: Capillary refill takes less than 2 seconds.      Coloration: Skin is not jaundiced or pale.       Findings: No erythema or rash.   Neurological:      Mental Status: He is alert and oriented to person, place, and time.   Psychiatric:         Mood and Affect: Mood normal.          Additional Data:     Lab Results:  Results from last 7 days   Lab Units 05/16/24  1840   WBC Thousand/uL 7.54   HEMOGLOBIN g/dL 13.6   HEMATOCRIT % 41.3   PLATELETS Thousands/uL 218   SEGS PCT % 75   LYMPHO PCT % 15   MONO PCT % 9   EOS PCT % 0     Results from last 7 days   Lab Units 05/16/24  1840   SODIUM mmol/L 139   POTASSIUM mmol/L 4.7   CHLORIDE mmol/L 107   CO2 mmol/L 25   BUN mg/dL 27*   CREATININE mg/dL 1.98*   ANION GAP mmol/L 7   CALCIUM mg/dL 9.2   ALBUMIN g/dL 4.0   TOTAL BILIRUBIN mg/dL 0.50   ALK PHOS U/L 47   ALT U/L 17   AST U/L 22   GLUCOSE RANDOM mg/dL 119     Results from last 7 days   Lab Units 05/16/24  1840   INR  1.35*                   Lines/Drains:  Invasive Devices       Peripheral Intravenous Line  Duration             Peripheral IV 05/16/24 Distal;Left;Upper;Ventral (anterior) Antecubital <1 day                        Imaging: Reviewed radiology reports from this admission including: chest CT scan, abdominal/pelvic CT, CT head, and CT cervical spine  TRAUMA - CT head wo contrast   Final Result by David Bean MD (05/16 1918)      No acute intracranial abnormality.                  Workstation performed: [id number]         TRAUMA - CT spine cervical wo contrast   Final Result by David Bean MD (05/16 1921)      No cervical spine fracture or traumatic malalignment.                  Workstation performed: [id number]         TRAUMA - CT chest abdomen pelvis w contrast   Final Result by David Bean MD (05/16 1931)      No findings of acute traumatic injury in the chest, abdomen or pelvis.      A few scattered bilateral pulmonary nodules measuring up to 3 mm. Based on current Fleischner Society 2017 Guidelines on incidental pulmonary nodule, no routine follow-up is needed if the patient is  low risk. If the patient is high risk, optional    follow-up chest CT at 12 months can be considered.      Scattered colonic diverticulosis with no inflammatory changes present to suggest acute diverticulitis.      Workstation performed: [id number]         XR Trauma chest portable   Final Result by David Bean MD (05/16 1933)      No acute cardiopulmonary disease.            Workstation performed: [id number]             EKG and Other Studies Reviewed on Admission:   EKG: NSR. HR 60 bpm.    ** Please Note: This note has been constructed using a voice recognition system. **

## 2024-05-17 NOTE — ASSESSMENT & PLAN NOTE
Creatinine levels at 1.98  Baseline appears to be between 0.9 and 1.1  Received IV fluids in the ER  --Avoid nephrotoxic agents  --Continue gentle IV fluids  --Monitor urinary output, electrolytes, creatinine levels.  --Check a.m. CMP  --Nephrology consult

## 2024-05-17 NOTE — DISCHARGE SUMMARY
[facility]  Discharge- Prieto Tyson Sr. 1950, 74 y.o. male MRN: [id number]  Unit/Bed#: RM22 Encounter: [id number]  Primary Care Provider: Rhoda Castillo MD   Date and time admitted to hospital: 5/16/2024  6:24 PM    Essential hypertension  Assessment & Plan  Blood pressure initially low upon arrival to the ER  Currently stable  Monitor blood pressure closely  Blood pressures continue to be labile in the ED, patient was recommended to continue holding lisinopril, and spironolactone given kidney function and low BP.  He should follow-up with his PCP prior to resuming these medications       KARTHIKEYAN (acute kidney injury) (HCC)  Assessment & Plan  Creatinine levels at 1.98  Baseline appears to be between 0.9 and 1.1  Received IV fluids in the ER  Avoid nephrotoxic agents  Continue gentle IV fluids, improving Cr   Monitor urinary output, electrolytes, creatinine levels.  Cr downternding   Nephrology consult  Likely prerenal - unclear if combination of aldactone, lisinopril and jardiance contributing to volume depletion   Flomax w/ side effect of hypotension   Renal US ordered, bladder scan  Orthostatic VS    * Syncope and collapse  Assessment & Plan  Presented to the ER via EMS for evaluation of syncope episode yesterday after golfing all day.   Patient reports feeling lightheaded and dizzy prior to syncopal episodes  CT head, CT cervical spine negative for acute findings  EKG shows a sinus rhythm at a rate of 60 beats per minute  ECHO: EF 45 to 50%, systolic function mildly reduced, diastolic function normal  Gentle IV fluids with improvement  Patient's BP remained labile in the ED - patient was encouraged to hold his HTN medications given KARTHIKEYAN and low blood pressures. He should follow up with his PCP prior to restarting these medications.   F/u outpatient with cardiology         Medical Problems       Resolved Problems  Date Reviewed: 5/17/2024   None       Discharging Physician / Practitioner: Carey  Eloise Tyler PA-C  PCP: Rhoda Castillo MD  Admission Date:   Admission Orders (From admission, onward)       Ordered        05/16/24 2053  Place in Observation  Once                          Discharge Date: 05/17/24    Consultations During Hospital Stay:  Cardiology     Procedures Performed:   None     Significant Findings / Test Results:   US kidney and bladder 5/17/2024  Impression: There is a left-sided bladder diverticulum. Multiple small simple cysts in both kidneys. Otherwise unremarkable renal ultrasound. Workstation performed: [id number]     Echo complete w/ contrast if indicated 5/17/2024  Narrative:   Left Ventricle: Left ventricular cavity size is normal. Wall thickness is normal. The left ventricular ejection fraction is 45 - 50%. Systolic function is mildly reduced. There is mild global hypokinesis. Diastolic function is normal.   Mitral Valve: There is trace regurgitation.   Tricuspid Valve: There is trace regurgitation.   Pulmonary Artery: The estimated pulmonary artery systolic pressure is 18.0 mmHg.     XR Trauma chest portable 5/16/2024  Impression: No acute cardiopulmonary disease.     TRAUMA - CT chest abdomen pelvis w contrast: 5/16/2024  Impression: No findings of acute traumatic injury in the chest, abdomen or pelvis. A few scattered bilateral pulmonary nodules measuring up to 3 mm. Based on current Fleischner Society 2017 Guidelines on incidental pulmonary nodule, no routine follow-up is needed if the patient is low risk. If the patient is high risk, optional follow-up chest CT at 12 months can be considered. Scattered colonic diverticulosis with no inflammatory changes present to suggest acute diverticulitis.     TRAUMA - CT spine cervical wo contrast 5/16/2024  Impression: No cervical spine fracture or traumatic malalignment. Workstation performed: [id number]     TRAUMA - CT head wo contrast 5/16/2024  Impression: No acute intracranial abnormality      Incidental Findings:   None       Test  "Results Pending at Discharge (will require follow up):   None      Outpatient Tests Requested:  None     Complications:  None     Reason for Admission: syncopal episode    Hospital Course:   Prieto Tyson Sr. is a 74 y.o. male patient PMH hypertension, CAD, BPH who originally presented to the hospital on 5/16/2024 due to syncopal episode after golfing all day.  Workup revealing KARTHIKEYAN.  Echo within normal limits.  CT without any findings of acute traumatic injuries of chest abdomen or pelvis.  Patient was hydrated with IV fluids.  Blood pressure medications were held during this time.  Cardiology was consulted given history of cardiomyopathy.  Blood pressures and heart rate remained lower while in the emergency room.  Recommend to continue holding his lisinopril, and spironolactone as these medications along with metoprolol, Jardiance, and Flomax can cause volume depletion/hypotension.  He is to follow-up with his primary care provider regarding initiation of blood pressure medications.  He should also follow-up with his outpatient cardiologist.    Please see above list of diagnoses and related plan for additional information.     Condition at Discharge: stable    Discharge Day Visit / Exam:   Subjective: Seen and examined.  Feeling much better today.  No acute complaints  Vitals: Blood Pressure: 108/63 (05/17/24 1126)  Pulse: 55 (05/17/24 0942)  Temperature: (!) 96.8 °F (36 °C) (05/17/24 1126)  Temp Source: Temporal (05/17/24 1126)  Respirations: 18 (05/17/24 0345)  Height: 5' 7\" (170.2 cm) (05/17/24 0942)  Weight - Scale: 96.6 kg (212 lb 15.4 oz) (05/17/24 0942)  SpO2: 99 % (05/17/24 0345)  Exam:   Physical Exam  Constitutional:       General: He is not in acute distress.  HENT:      Mouth/Throat:      Mouth: Mucous membranes are moist.   Eyes:      Conjunctiva/sclera: Conjunctivae normal.   Cardiovascular:      Heart sounds: Normal heart sounds.   Pulmonary:      Breath sounds: Normal breath sounds.   Abdominal:      " Palpations: Abdomen is soft.   Musculoskeletal:      Right lower leg: No edema.      Left lower leg: No edema.   Skin:     General: Skin is warm and dry.   Neurological:      Mental Status: Mental status is at baseline.        Discussion with Family: Patient declined call to .     Discharge instructions/Information to patient and family:   See after visit summary for information provided to patient and family.      Provisions for Follow-Up Care:  See after visit summary for information related to follow-up care and any pertinent home health orders.      Mobility at time of Discharge:      HLM Goal achieved. Continue to encourage appropriate mobility.     Disposition:   Home    Planned Readmission: None      Discharge Statement:  I spent 55 minutes discharging the patient. This time was spent on the day of discharge. I had direct contact with the patient on the day of discharge. Greater than 50% of the total time was spent examining patient, answering all patient questions, arranging and discussing plan of care with patient as well as directly providing post-discharge instructions.  Additional time then spent on discharge activities.    Discharge Medications:  See after visit summary for reconciled discharge medications provided to patient and/or family.      **Please Note: This note may have been constructed using a voice recognition system**

## 2024-05-17 NOTE — ASSESSMENT & PLAN NOTE
Blood pressure initially low upon arrival to the ER  Currently stable  Monitor blood pressure closely  Blood pressures continue to be labile in the ED, patient was recommended to continue holding lisinopril, and spironolactone given kidney function and low BP.  He should follow-up with his PCP prior to resuming these medications

## 2024-05-29 ENCOUNTER — APPOINTMENT (OUTPATIENT)
Dept: LAB | Facility: CLINIC | Age: 74
End: 2024-05-29
Payer: MEDICARE

## 2024-05-29 DIAGNOSIS — N17.9 AKI (ACUTE KIDNEY INJURY) (HCC): ICD-10-CM

## 2024-05-29 LAB
ANION GAP SERPL CALCULATED.3IONS-SCNC: 9 MMOL/L (ref 4–13)
BUN SERPL-MCNC: 20 MG/DL (ref 5–25)
CALCIUM SERPL-MCNC: 9.2 MG/DL (ref 8.4–10.2)
CHLORIDE SERPL-SCNC: 107 MMOL/L (ref 96–108)
CO2 SERPL-SCNC: 27 MMOL/L (ref 21–32)
CREAT SERPL-MCNC: 1.37 MG/DL (ref 0.6–1.3)
GFR SERPL CREATININE-BSD FRML MDRD: 50 ML/MIN/1.73SQ M
GLUCOSE P FAST SERPL-MCNC: 107 MG/DL (ref 65–99)
POTASSIUM SERPL-SCNC: 4.7 MMOL/L (ref 3.5–5.3)
SODIUM SERPL-SCNC: 143 MMOL/L (ref 135–147)

## 2024-05-29 PROCEDURE — 36415 COLL VENOUS BLD VENIPUNCTURE: CPT

## 2024-05-29 PROCEDURE — 80048 BASIC METABOLIC PNL TOTAL CA: CPT

## 2024-05-31 PROBLEM — H43.393 VITREOUS FLOATERS; BOTH EYES: Status: ACTIVE | Noted: 2024-05-31

## 2024-05-31 PROBLEM — H35.40 PERIPAPILLARY ATROPHY: Status: ACTIVE | Noted: 2024-05-31

## 2024-07-18 ENCOUNTER — DOCTOR'S OFFICE (OUTPATIENT)
Dept: URBAN - NONMETROPOLITAN AREA CLINIC 1 | Facility: CLINIC | Age: 74
Setting detail: OPHTHALMOLOGY
End: 2024-07-18
Payer: MEDICARE

## 2024-07-18 DIAGNOSIS — H16.221: ICD-10-CM

## 2024-07-18 DIAGNOSIS — H43.813: ICD-10-CM

## 2024-07-18 DIAGNOSIS — H43.393: ICD-10-CM

## 2024-07-18 DIAGNOSIS — H16.222: ICD-10-CM

## 2024-07-18 DIAGNOSIS — H35.373: ICD-10-CM

## 2024-07-18 PROCEDURE — 83861 MICROFLUID ANALY TEARS: CPT | Mod: QW,LT | Performed by: OPHTHALMOLOGY

## 2024-07-18 PROCEDURE — 99213 OFFICE O/P EST LOW 20 MIN: CPT | Performed by: OPHTHALMOLOGY

## 2024-07-18 PROCEDURE — 83861 MICROFLUID ANALY TEARS: CPT | Mod: QW,RT | Performed by: OPHTHALMOLOGY

## 2024-07-18 PROCEDURE — 92134 CPTRZ OPH DX IMG PST SGM RTA: CPT | Performed by: OPHTHALMOLOGY

## 2024-07-18 ASSESSMENT — CONFRONTATIONAL VISUAL FIELD TEST (CVF)
OS_FINDINGS: FULL
OD_FINDINGS: FULL

## 2024-08-01 ENCOUNTER — DOCTOR'S OFFICE (OUTPATIENT)
Dept: URBAN - NONMETROPOLITAN AREA CLINIC 1 | Facility: CLINIC | Age: 74
Setting detail: OPHTHALMOLOGY
End: 2024-08-01
Payer: MEDICARE

## 2024-08-01 DIAGNOSIS — H35.373: ICD-10-CM

## 2024-08-01 DIAGNOSIS — H43.813: ICD-10-CM

## 2024-08-01 DIAGNOSIS — H43.393: ICD-10-CM

## 2024-08-01 DIAGNOSIS — H16.221: ICD-10-CM

## 2024-08-01 DIAGNOSIS — H16.222: ICD-10-CM

## 2024-08-01 PROCEDURE — 92235 FLUORESCEIN ANGRPH MLTIFRAME: CPT | Performed by: OPHTHALMOLOGY

## 2024-08-01 PROCEDURE — 99213 OFFICE O/P EST LOW 20 MIN: CPT | Performed by: OPHTHALMOLOGY

## 2024-08-01 PROCEDURE — 92250 FUNDUS PHOTOGRAPHY W/I&R: CPT | Performed by: OPHTHALMOLOGY

## 2024-08-01 ASSESSMENT — CONFRONTATIONAL VISUAL FIELD TEST (CVF)
OD_FINDINGS: FULL
OS_FINDINGS: FULL

## 2024-08-22 ENCOUNTER — DOCTOR'S OFFICE (OUTPATIENT)
Dept: URBAN - NONMETROPOLITAN AREA CLINIC 1 | Facility: CLINIC | Age: 74
Setting detail: OPHTHALMOLOGY
End: 2024-08-22
Payer: MEDICARE

## 2024-08-22 DIAGNOSIS — H43.393: ICD-10-CM

## 2024-08-22 DIAGNOSIS — H35.373: ICD-10-CM

## 2024-08-22 DIAGNOSIS — H16.221: ICD-10-CM

## 2024-08-22 DIAGNOSIS — H43.813: ICD-10-CM

## 2024-08-22 DIAGNOSIS — H16.222: ICD-10-CM

## 2024-08-22 PROCEDURE — 92134 CPTRZ OPH DX IMG PST SGM RTA: CPT | Performed by: OPHTHALMOLOGY

## 2024-08-22 PROCEDURE — 92012 INTRM OPH EXAM EST PATIENT: CPT | Performed by: OPHTHALMOLOGY

## 2024-08-22 ASSESSMENT — CONFRONTATIONAL VISUAL FIELD TEST (CVF)
OD_FINDINGS: FULL
OS_FINDINGS: FULL

## 2024-10-10 ENCOUNTER — DOCTOR'S OFFICE (OUTPATIENT)
Dept: URBAN - NONMETROPOLITAN AREA CLINIC 1 | Facility: CLINIC | Age: 74
Setting detail: OPHTHALMOLOGY
End: 2024-10-10
Payer: MEDICARE

## 2024-10-10 DIAGNOSIS — H43.811: ICD-10-CM

## 2024-10-10 DIAGNOSIS — H16.222: ICD-10-CM

## 2024-10-10 DIAGNOSIS — H35.372: ICD-10-CM

## 2024-10-10 DIAGNOSIS — H35.371: ICD-10-CM

## 2024-10-10 DIAGNOSIS — H16.221: ICD-10-CM

## 2024-10-10 DIAGNOSIS — H43.812: ICD-10-CM

## 2024-10-10 PROCEDURE — 83861 MICROFLUID ANALY TEARS: CPT | Mod: QW,LT | Performed by: OPHTHALMOLOGY

## 2024-10-10 PROCEDURE — 92134 CPTRZ OPH DX IMG PST SGM RTA: CPT | Performed by: OPHTHALMOLOGY

## 2024-10-10 PROCEDURE — 99213 OFFICE O/P EST LOW 20 MIN: CPT | Performed by: OPHTHALMOLOGY

## 2024-10-10 PROCEDURE — 83861 MICROFLUID ANALY TEARS: CPT | Mod: QW,RT | Performed by: OPHTHALMOLOGY

## 2024-10-10 ASSESSMENT — REFRACTION_CURRENTRX
OS_OVR_VA: 20/
OS_OVR_VA: 20/
OD_OVR_VA: 20/
OD_OVR_VA: 20/
OS_SPHERE: +2.00
OD_SPHERE: +2.00

## 2024-10-10 ASSESSMENT — REFRACTION_AUTOREFRACTION
OS_SPHERE: +0.75
OS_AXIS: 020
OD_CYLINDER: -0.50
OD_SPHERE: +0.75
OS_CYLINDER: -1.00
OD_AXIS: 087

## 2024-10-10 ASSESSMENT — CORNEAL DYSTROPHY - POSTERIOR
OS_POSTERIORDYSTROPHY: GUTTATA
OD_POSTERIORDYSTROPHY: GUTTATA

## 2024-10-10 ASSESSMENT — CONFRONTATIONAL VISUAL FIELD TEST (CVF)
OD_FINDINGS: FULL
OS_FINDINGS: FULL

## 2024-10-10 ASSESSMENT — DRY EYES - PHYSICIAN NOTES: OS_GENERALCOMMENTS: MILD KSICCA

## 2024-10-10 ASSESSMENT — VISUAL ACUITY
OD_BCVA: 20/30+2
OS_BCVA: 20/25+1

## 2024-11-21 ENCOUNTER — DOCTOR'S OFFICE (OUTPATIENT)
Dept: URBAN - NONMETROPOLITAN AREA CLINIC 1 | Facility: CLINIC | Age: 74
Setting detail: OPHTHALMOLOGY
End: 2024-11-21
Payer: MEDICARE

## 2024-11-21 DIAGNOSIS — H43.813: ICD-10-CM

## 2024-11-21 DIAGNOSIS — H35.372: ICD-10-CM

## 2024-11-21 DIAGNOSIS — H35.371: ICD-10-CM

## 2024-11-21 DIAGNOSIS — H16.221: ICD-10-CM

## 2024-11-21 PROCEDURE — 92202 OPSCPY EXTND ON/MAC DRAW: CPT | Performed by: OPHTHALMOLOGY

## 2024-11-21 PROCEDURE — 99214 OFFICE O/P EST MOD 30 MIN: CPT | Performed by: OPHTHALMOLOGY

## 2024-11-21 PROCEDURE — 92134 CPTRZ OPH DX IMG PST SGM RTA: CPT | Performed by: OPHTHALMOLOGY

## 2024-11-21 ASSESSMENT — REFRACTION_AUTOREFRACTION
OS_AXIS: 020
OS_CYLINDER: -1.00
OD_SPHERE: +0.75
OD_AXIS: 087
OS_SPHERE: +0.75
OD_CYLINDER: -0.50

## 2024-11-21 ASSESSMENT — CONFRONTATIONAL VISUAL FIELD TEST (CVF)
OD_FINDINGS: FULL
OS_FINDINGS: FULL

## 2024-11-21 ASSESSMENT — REFRACTION_CURRENTRX
OS_OVR_VA: 20/
OD_OVR_VA: 20/
OS_SPHERE: +2.00
OD_SPHERE: +2.00
OD_OVR_VA: 20/
OS_OVR_VA: 20/

## 2024-11-21 ASSESSMENT — CORNEAL DYSTROPHY - POSTERIOR
OS_POSTERIORDYSTROPHY: GUTTATA
OD_POSTERIORDYSTROPHY: GUTTATA

## 2024-11-21 ASSESSMENT — VISUAL ACUITY
OS_BCVA: 20/25-2
OD_BCVA: 20/25+1

## 2024-11-21 ASSESSMENT — DRY EYES - PHYSICIAN NOTES: OS_GENERALCOMMENTS: MILD KSICCA

## 2024-12-20 ENCOUNTER — DOCTOR'S OFFICE (OUTPATIENT)
Dept: URBAN - NONMETROPOLITAN AREA CLINIC 1 | Facility: CLINIC | Age: 74
Setting detail: OPHTHALMOLOGY
End: 2024-12-20
Payer: MEDICARE

## 2024-12-20 ENCOUNTER — RX ONLY (RX ONLY)
Age: 74
End: 2024-12-20

## 2024-12-20 VITALS — HEIGHT: 60 IN

## 2024-12-20 DIAGNOSIS — H35.40: ICD-10-CM

## 2024-12-20 DIAGNOSIS — H35.371: ICD-10-CM

## 2024-12-20 DIAGNOSIS — H18.893: ICD-10-CM

## 2024-12-20 DIAGNOSIS — C85.80: ICD-10-CM

## 2024-12-20 DIAGNOSIS — H43.813: ICD-10-CM

## 2024-12-20 DIAGNOSIS — H35.372: ICD-10-CM

## 2024-12-20 PROCEDURE — 92014 COMPRE OPH EXAM EST PT 1/>: CPT | Performed by: OPHTHALMOLOGY

## 2024-12-20 PROCEDURE — 92134 CPTRZ OPH DX IMG PST SGM RTA: CPT | Performed by: OPHTHALMOLOGY

## 2024-12-20 ASSESSMENT — KERATOMETRY
OS_K2POWER_DIOPTERS: 41.25
OD_AXISANGLE_DEGREES: 158
OS_AXISANGLE_DEGREES: 086
OD_K2POWER_DIOPTERS: 42.25
OS_K1POWER_DIOPTERS: 40.75
OD_K1POWER_DIOPTERS: 41.75

## 2024-12-20 ASSESSMENT — CORNEAL DYSTROPHY
OD_DYSTROPHY: VERTICILLATA
OS_DYSTROPHY: VERTICILLATA

## 2024-12-20 ASSESSMENT — CONFRONTATIONAL VISUAL FIELD TEST (CVF)
OS_FINDINGS: FULL
OD_FINDINGS: FULL

## 2024-12-20 ASSESSMENT — REFRACTION_AUTOREFRACTION
OD_AXIS: 084
OS_CYLINDER: 0.00
OS_SPHERE: +0.50
OD_SPHERE: +1.00
OD_CYLINDER: -0.75
OS_AXIS: 020

## 2024-12-20 ASSESSMENT — REFRACTION_CURRENTRX
OD_OVR_VA: 20/
OS_SPHERE: +2.00
OS_OVR_VA: 20/
OD_OVR_VA: 20/
OD_SPHERE: +2.00
OS_OVR_VA: 20/

## 2024-12-20 ASSESSMENT — DRY EYES - PHYSICIAN NOTES: OS_GENERALCOMMENTS: MILD KSICCA

## 2024-12-20 ASSESSMENT — VISUAL ACUITY
OD_BCVA: 20/25-1
OS_BCVA: 20/25+2

## 2025-04-14 ENCOUNTER — DOCTOR'S OFFICE (OUTPATIENT)
Dept: URBAN - NONMETROPOLITAN AREA CLINIC 1 | Facility: CLINIC | Age: 75
Setting detail: OPHTHALMOLOGY
End: 2025-04-14
Payer: MEDICARE

## 2025-04-14 DIAGNOSIS — H43.813: ICD-10-CM

## 2025-04-14 DIAGNOSIS — H35.372: ICD-10-CM

## 2025-04-14 DIAGNOSIS — H43.393: ICD-10-CM

## 2025-04-14 DIAGNOSIS — H35.371: ICD-10-CM

## 2025-04-14 PROCEDURE — 99213 OFFICE O/P EST LOW 20 MIN: CPT | Performed by: OPHTHALMOLOGY

## 2025-04-14 PROCEDURE — 92201 OPSCPY EXTND RTA DRAW UNI/BI: CPT | Performed by: OPHTHALMOLOGY

## 2025-04-14 PROCEDURE — 92250 FUNDUS PHOTOGRAPHY W/I&R: CPT | Performed by: OPHTHALMOLOGY

## 2025-04-14 ASSESSMENT — CORNEAL DYSTROPHY
OD_DYSTROPHY: VERTICILLATA
OS_DYSTROPHY: VERTICILLATA

## 2025-04-14 ASSESSMENT — CONFRONTATIONAL VISUAL FIELD TEST (CVF)
OD_FINDINGS: FULL
OS_FINDINGS: FULL

## 2025-04-14 ASSESSMENT — DRY EYES - PHYSICIAN NOTES: OS_GENERALCOMMENTS: MILD KSICCA

## 2025-04-15 PROBLEM — H35.371: Status: ACTIVE | Noted: 2025-04-14

## 2025-04-15 PROBLEM — H35.372: Status: ACTIVE | Noted: 2025-04-14

## 2025-04-15 ASSESSMENT — REFRACTION_AUTOREFRACTION
OD_CYLINDER: -0.75
OD_SPHERE: +1.00
OS_AXIS: 020
OS_CYLINDER: 0.00
OD_AXIS: 084
OS_SPHERE: +0.50

## 2025-04-15 ASSESSMENT — REFRACTION_CURRENTRX
OD_SPHERE: +2.00
OS_OVR_VA: 20/
OD_OVR_VA: 20/
OS_SPHERE: +2.00
OS_OVR_VA: 20/
OD_OVR_VA: 20/

## 2025-04-15 ASSESSMENT — VISUAL ACUITY
OS_BCVA: 20/25
OD_BCVA: 20/25-1

## 2025-04-15 ASSESSMENT — KERATOMETRY
OS_AXISANGLE_DEGREES: 086
OS_K2POWER_DIOPTERS: 41.25
OD_K1POWER_DIOPTERS: 41.75
OD_K2POWER_DIOPTERS: 42.25
OD_AXISANGLE_DEGREES: 158
OS_K1POWER_DIOPTERS: 40.75

## 2025-04-22 ENCOUNTER — HOSPITAL ENCOUNTER (EMERGENCY)
Facility: HOSPITAL | Age: 75
Discharge: HOME | End: 2025-04-22
Payer: MEDICARE

## 2025-04-22 VITALS
TEMPERATURE: 98.5 F | BODY MASS INDEX: 33.74 KG/M2 | DIASTOLIC BLOOD PRESSURE: 72 MMHG | OXYGEN SATURATION: 97 % | SYSTOLIC BLOOD PRESSURE: 102 MMHG | RESPIRATION RATE: 17 BRPM | HEIGHT: 67 IN | HEART RATE: 61 BPM | WEIGHT: 215 LBS
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
## 2025-04-22 DIAGNOSIS — A69.20 ERYTHEMA MIGRANS (LYME DISEASE): ICD-10-CM

## 2025-04-22 PROCEDURE — 99281 EMR DPT VST MAYX REQ PHY/QHP: CPT

## 2025-04-22 PROCEDURE — 99284 EMERGENCY DEPT VISIT MOD MDM: CPT | Performed by: PHYSICIAN ASSISTANT

## 2025-04-22 RX ORDER — DOXYCYCLINE 100 MG/1
100 CAPSULE ORAL 2 TIMES DAILY
Qty: 28 CAPSULE | Refills: 0 | Status: SHIPPED | OUTPATIENT
Start: 2025-04-22 | End: 2025-05-06

## 2025-04-22 RX ORDER — DOXYCYCLINE 100 MG/1
200 CAPSULE ORAL ONCE
Status: COMPLETED | OUTPATIENT
Start: 2025-04-22 | End: 2025-04-22

## 2025-04-22 RX ADMIN — DOXYCYCLINE 200 MG: 100 CAPSULE ORAL at 21:33

## 2025-04-23 NOTE — ED PROVIDER NOTES
Time reflects when diagnosis was documented in both MDM as applicable and the Disposition within this note       Time User Action Codes Description Comment    4/22/2025  9:30 PM Elieser Boateng [W57.XXXA] Tick bite     4/22/2025  9:30 PM Elieser Boateng [A69.20] Erythema migrans (Lyme disease)           ED Disposition       ED Disposition   Discharge    Condition   Stable    Date/Time   Tue Apr 22, 2025  9:31 PM    Comment   Prieto Tyson Sr. discharge to home/self care.                   Assessment & Plan       Medical Decision Making  The patient is a 75-year-old male who presents with a chief complaint of rash and insect bite.  Patient states that today he noticed a bull's-eye rash on his right forearm.  He states that he did have a tick rash similar in the past.  They did not notice a tick on him or find one engorged.  He states that he noticed the rash on his right arm today.  States that he has some erythema to his left face .  No Fevers chills nausea vomiting falls or traumas    Will treat with doxycycline and continue this treatment for 14 days patient agreement treatment plan discharged home.    Risk  Prescription drug management.             Medications   doxycycline hyclate (VIBRAMYCIN) capsule 200 mg (200 mg Oral Given 4/22/25 2133)       ED Risk Strat Scores                    No data recorded                            History of Present Illness       Chief Complaint   Patient presents with    Insect Bite     Pt has a circular inset bite on his right posterior forearm that he noticed abotu 2 hours PTA.       Past Medical History:   Diagnosis Date    Cardiac angina (HCC)     EF of 25%    Coronary artery disease     Hypertension       History reviewed. No pertinent surgical history.   History reviewed. No pertinent family history.   Social History     Tobacco Use    Smoking status: Never     Passive exposure: Never    Smokeless tobacco: Never   Vaping Use    Vaping status: Never Used   Substance Use  Topics    Alcohol use: Not Currently    Drug use: Not Currently      E-Cigarette/Vaping    E-Cigarette Use Never User       E-Cigarette/Vaping Substances      I have reviewed and agree with the history as documented.     The patient is a 75-year-old male who presents with a chief complaint of rash and insect bite.  Patient states that today he noticed a bull's-eye rash on his right forearm.  He states that he did have a tick rash similar in the past.  They did not notice a tick on him or find one engorged.  He states that he noticed the rash on his right arm today.  States that he has some erythema to his left face .  No Fevers chills nausea vomiting falls or traumas          Review of Systems   All other systems reviewed and are negative.          Objective       ED Triage Vitals [04/22/25 1916]   Temperature Pulse Blood Pressure Respirations SpO2 Patient Position - Orthostatic VS   98.5 °F (36.9 °C) 61 102/72 17 97 % Sitting      Temp Source Heart Rate Source BP Location FiO2 (%) Pain Score    Temporal -- Left arm -- --      Vitals      Date and Time Temp Pulse SpO2 Resp BP Pain Score FACES Pain Rating User   04/22/25 1916 98.5 °F (36.9 °C) 61 97 % 17 102/72 -- -- RG            Physical Exam  Vitals and nursing note reviewed.   Constitutional:       General: He is not in acute distress.     Appearance: He is well-developed.   HENT:      Head: Normocephalic.   Eyes:      Pupils: Pupils are equal, round, and reactive to light.   Cardiovascular:      Rate and Rhythm: Normal rate.   Pulmonary:      Effort: Pulmonary effort is normal.   Musculoskeletal:      Cervical back: Normal range of motion.   Skin:     General: Skin is warm and dry.      Capillary Refill: Capillary refill takes less than 2 seconds.      Findings: Rash present.      Comments: Bull's-eye rash to the right forearm see picture   Neurological:      Mental Status: He is alert and oriented to person, place, and time.   Psychiatric:         Behavior:  Behavior normal.         Results Reviewed       None            No orders to display       Procedures    ED Medication and Procedure Management   Prior to Admission Medications   Prescriptions Last Dose Informant Patient Reported? Taking?   Docusate Sodium 100 MG capsule   Yes No   Sig: Docusate Sodium Oral     1 bid    active   apixaban (ELIQUIS) 5 mg   Yes No   Sig: Apixaban Oral  PO 1.0  BID    active   finasteride (PROSCAR) 5 mg tablet   Yes No   Sig: Finasteride Oral (Proscar)     1 a day   inactive   levothyroxine 75 mcg tablet   Yes No   Sig: Levothyroxine Oral    QD    inactive   metoprolol succinate (TOPROL-XL) 50 mg 24 hr tablet   Yes No   Sig: Metoprolol Oral 24 hr Tab (Succinate)    mg BID    active   rosuvastatin (CRESTOR) 20 MG tablet   Yes No   Sig: Rosuvastatin Calcium Oral     1 daily  in the am   active   tamsulosin (FLOMAX) 0.4 mg   Yes No   Sig: Tamsulosin Oral     1 x a day half hr after supper   inactive      Facility-Administered Medications: None     Discharge Medication List as of 4/22/2025  9:34 PM        START taking these medications    Details   doxycycline hyclate (VIBRAMYCIN) 100 mg capsule Take 1 capsule (100 mg total) by mouth 2 (two) times a day for 14 days, Starting Tue 4/22/2025, Until Tue 5/6/2025, Normal           CONTINUE these medications which have NOT CHANGED    Details   apixaban (ELIQUIS) 5 mg Apixaban Oral  PO 1.0  BID    active, Historical Med      Docusate Sodium 100 MG capsule Docusate Sodium Oral     1 bid    active, Historical Med      finasteride (PROSCAR) 5 mg tablet Finasteride Oral (Proscar)     1 a day   inactive, Historical Med      levothyroxine 75 mcg tablet Levothyroxine Oral    QD    inactive, Historical Med      metoprolol succinate (TOPROL-XL) 50 mg 24 hr tablet Metoprolol Oral 24 hr Tab (Succinate)    mg BID    active, Historical Med      rosuvastatin (CRESTOR) 20 MG tablet Rosuvastatin Calcium Oral     1 daily  in the am   active, Historical Med       tamsulosin (FLOMAX) 0.4 mg Tamsulosin Oral     1 x a day half hr after supper   inactive, Historical Med           No discharge procedures on file.  ED SEPSIS DOCUMENTATION   Time reflects when diagnosis was documented in both MDM as applicable and the Disposition within this note       Time User Action Codes Description Comment    4/22/2025  9:30 PM Elieser Boateng [W57.XXXA] Tick bite     4/22/2025  9:30 PM Elieser Boateng [A69.20] Erythema migrans (Lyme disease)                  Elieser Boateng PA-C  04/22/25 2391

## 2025-05-05 ENCOUNTER — DOCTOR'S OFFICE (OUTPATIENT)
Dept: URBAN - NONMETROPOLITAN AREA CLINIC 1 | Facility: CLINIC | Age: 75
Setting detail: OPHTHALMOLOGY
End: 2025-05-05
Payer: MEDICARE

## 2025-05-05 DIAGNOSIS — H35.373: ICD-10-CM

## 2025-05-05 DIAGNOSIS — H43.813: ICD-10-CM

## 2025-05-05 DIAGNOSIS — H43.393: ICD-10-CM

## 2025-05-05 PROCEDURE — 99214 OFFICE O/P EST MOD 30 MIN: CPT | Performed by: OPHTHALMOLOGY

## 2025-05-05 PROCEDURE — 92134 CPTRZ OPH DX IMG PST SGM RTA: CPT | Performed by: OPHTHALMOLOGY

## 2025-05-05 PROCEDURE — 92202 OPSCPY EXTND ON/MAC DRAW: CPT | Performed by: OPHTHALMOLOGY

## 2025-05-05 ASSESSMENT — REFRACTION_AUTOREFRACTION
OD_AXIS: 084
OD_SPHERE: +1.00
OS_AXIS: 020
OD_CYLINDER: -0.75
OS_CYLINDER: 0.00
OS_SPHERE: +0.50

## 2025-05-05 ASSESSMENT — REFRACTION_CURRENTRX
OD_OVR_VA: 20/
OS_OVR_VA: 20/
OS_OVR_VA: 20/
OS_SPHERE: +2.00
OD_SPHERE: +2.00
OD_OVR_VA: 20/

## 2025-05-05 ASSESSMENT — CONFRONTATIONAL VISUAL FIELD TEST (CVF)
OD_FINDINGS: FULL
OS_FINDINGS: FULL

## 2025-05-05 ASSESSMENT — KERATOMETRY
OS_K2POWER_DIOPTERS: 41.25
OS_K1POWER_DIOPTERS: 40.75
OS_AXISANGLE_DEGREES: 086
OD_K2POWER_DIOPTERS: 42.25
OD_AXISANGLE_DEGREES: 158
OD_K1POWER_DIOPTERS: 41.75

## 2025-05-05 ASSESSMENT — CORNEAL DYSTROPHY
OD_DYSTROPHY: VERTICILLATA
OS_DYSTROPHY: VERTICILLATA

## 2025-05-05 ASSESSMENT — DRY EYES - PHYSICIAN NOTES: OS_GENERALCOMMENTS: MILD KSICCA

## 2025-05-05 ASSESSMENT — VISUAL ACUITY
OD_BCVA: 20/30+2
OS_BCVA: 20/30+1

## 2025-07-03 ENCOUNTER — APPOINTMENT (OUTPATIENT)
Dept: RADIOLOGY | Facility: HOSPITAL | Age: 75
End: 2025-07-03
Payer: MEDICARE

## 2025-07-03 ENCOUNTER — HOSPITAL ENCOUNTER (EMERGENCY)
Facility: HOSPITAL | Age: 75
Discharge: HOME | End: 2025-07-03
Attending: EMERGENCY MEDICINE
Payer: MEDICARE

## 2025-07-03 VITALS
TEMPERATURE: 98 F | WEIGHT: 215 LBS | BODY MASS INDEX: 33.74 KG/M2 | HEART RATE: 61 BPM | OXYGEN SATURATION: 98 % | DIASTOLIC BLOOD PRESSURE: 77 MMHG | HEIGHT: 67 IN | SYSTOLIC BLOOD PRESSURE: 116 MMHG | RESPIRATION RATE: 18 BRPM

## 2025-07-03 DIAGNOSIS — M25.562 ACUTE PAIN OF LEFT KNEE: Primary | ICD-10-CM

## 2025-07-03 PROCEDURE — 99283 EMERGENCY DEPT VISIT LOW MDM: CPT

## 2025-07-03 PROCEDURE — 73564 X-RAY EXAM KNEE 4 OR MORE: CPT

## 2025-07-03 PROCEDURE — 99284 EMERGENCY DEPT VISIT MOD MDM: CPT | Performed by: EMERGENCY MEDICINE

## 2025-07-03 NOTE — ED PROVIDER NOTES
Time reflects when diagnosis was documented in both MDM as applicable and the Disposition within this note       Time User Action Codes Description Comment    7/3/2025 11:37 AM Trey Rashid Add [M25.562] Acute pain of left knee           ED Disposition       ED Disposition   Discharge    Condition   Stable    Date/Time   Thu Jul 3, 2025 11:37 AM    Comment   Prieto Tyson Sr. discharge to home/self care.                   Assessment & Plan       Medical Decision Making  75-year-old male presents with cc of left knee pain after twisting it yesterday. No hip pain. No n/v. No swelling. Pain is mostly at his medial aspect of the knee.     Xray is neg for fx. Pt already made an appointment w ortho    Recommend no strenuous activity.        ED Course as of 07/03/25 1137   Thu Jul 03, 2025   1136 No evidence of fx on xray. Pt already made an appt w his orthopedist. Will DC pt home        Medications - No data to display    ED Risk Strat Scores                    (ISAR) Identification of Seniors at Risk  Before the illness or injury that brought you to the Emergency, did you need someone to help you on a regular basis?: 0  In the last 24 hours, have you needed more help than usual?: 0  Have you been hospitalized for one or more nights during the past 6 months?: 0  In general, do you see well?: 1  In general, do you have serious problems with your memory?: 0  Do you take more than three different medications every day?: 1  ISAR Score: 2            SBIRT 20yo+      Flowsheet Row Most Recent Value   Initial Alcohol Screen: US AUDIT-C     1. How often do you have a drink containing alcohol? 0 Filed at: 07/03/2025 0949   2. How many drinks containing alcohol do you have on a typical day you are drinking?  0 Filed at: 07/03/2025 0949   3a. Male UNDER 65: How often do you have five or more drinks on one occasion? 0 Filed at: 07/03/2025 0949   3b. FEMALE Any Age, or MALE 65+: How often do you have 4 or more drinks on one occassion? 0  Filed at: 07/03/2025 0949   Audit-C Score 0 Filed at: 07/03/2025 0949   AUDI: How many times in the past year have you...    Used an illegal drug or used a prescription medication for non-medical reasons? Never Filed at: 07/03/2025 0949                            History of Present Illness       Chief Complaint   Patient presents with    Knee Pain     Patient reports L knee pain. Reports he turned wrong and felt the pain start on Sunday. Slight relief w/ extra strength tylenol. Couldn't get in to see ortho till 7/23.        Past Medical History[1]   Past Surgical History[2]   Family History[3]   Social History[4]   E-Cigarette/Vaping    E-Cigarette Use Never User       E-Cigarette/Vaping Substances      I have reviewed and agree with the history as documented.     75-year-old male presents with cc of left knee pain after twisting it yesterday. No hip pain. No n/v. No swelling. Pain is mostly at his medial aspect of the knee.       Knee Pain  Associated symptoms: no back pain and no fatigue        Review of Systems   Constitutional:  Positive for activity change. Negative for appetite change and fatigue.   HENT:  Negative for congestion.    Eyes:  Negative for discharge and itching.   Respiratory:  Negative for apnea, wheezing and stridor.    Endocrine: Negative for cold intolerance and heat intolerance.   Musculoskeletal:  Negative for back pain, gait problem, joint swelling and neck stiffness.   Skin:  Negative for color change.   Allergic/Immunologic: Negative for environmental allergies.   Neurological:  Negative for dizziness.   Psychiatric/Behavioral:  Negative for agitation.            Objective       ED Triage Vitals [07/03/25 0948]   Temperature Pulse Blood Pressure Respirations SpO2 Patient Position - Orthostatic VS   98 °F (36.7 °C) 61 116/77 18 98 % Sitting      Temp Source Heart Rate Source BP Location FiO2 (%) Pain Score    Temporal Monitor Left arm -- 8      Vitals      Date and Time Temp Pulse SpO2  Resp BP Pain Score FACES Pain Rating User   07/03/25 0948 98 °F (36.7 °C) 61 98 % 18 116/77 8 -- NB            Physical Exam  HENT:      Head: Normocephalic and atraumatic.      Nose: Nose normal.      Mouth/Throat:      Mouth: Mucous membranes are moist.     Eyes:      Extraocular Movements: Extraocular movements intact.      Pupils: Pupils are equal, round, and reactive to light.       Cardiovascular:      Pulses: Normal pulses.   Pulmonary:      Breath sounds: No wheezing.     Musculoskeletal:      Cervical back: No rigidity.      Comments: FROM of the joint. No swelling. No skin changes. Pt is able to put pressure and ambulate on the extremity      Skin:     General: Skin is warm.      Capillary Refill: Capillary refill takes less than 2 seconds.     Neurological:      General: No focal deficit present.      Mental Status: He is alert.     Psychiatric:         Mood and Affect: Mood normal.         Results Reviewed       None            XR knee 4+ vw left injury    (Results Pending)       Procedures    ED Medication and Procedure Management   Prior to Admission Medications   Prescriptions Last Dose Informant Patient Reported? Taking?   Docusate Sodium 100 MG capsule   Yes No   Sig: Docusate Sodium Oral     1 bid    active   apixaban (ELIQUIS) 5 mg   Yes No   Sig: Apixaban Oral  PO 1.0  BID    active   finasteride (PROSCAR) 5 mg tablet   Yes No   Sig: Finasteride Oral (Proscar)     1 a day   inactive   levothyroxine 75 mcg tablet   Yes No   Sig: Levothyroxine Oral    QD    inactive   metoprolol succinate (TOPROL-XL) 50 mg 24 hr tablet   Yes No   Sig: Metoprolol Oral 24 hr Tab (Succinate)    mg BID    active   rosuvastatin (CRESTOR) 20 MG tablet   Yes No   Sig: Rosuvastatin Calcium Oral     1 daily  in the am   active   tamsulosin (FLOMAX) 0.4 mg   Yes No   Sig: Tamsulosin Oral     1 x a day half hr after supper   inactive      Facility-Administered Medications: None     Patient's Medications   Discharge  Prescriptions    No medications on file     No discharge procedures on file.  ED SEPSIS DOCUMENTATION   Time reflects when diagnosis was documented in both MDM as applicable and the Disposition within this note       Time User Action Codes Description Comment    7/3/2025 11:37 AM Tery Rashid Add [M25.562] Acute pain of left knee                    [1]   Past Medical History:  Diagnosis Date    Cardiac angina (HCC)     EF of 25%    Coronary artery disease     Hypertension    [2] No past surgical history on file.  [3] No family history on file.  [4]   Social History  Tobacco Use    Smoking status: Never     Passive exposure: Never    Smokeless tobacco: Never   Vaping Use    Vaping status: Never Used   Substance Use Topics    Alcohol use: Not Currently    Drug use: Not Currently        Trey Rashid DO  07/03/25 1144

## 2025-07-14 ENCOUNTER — DOCTOR'S OFFICE (OUTPATIENT)
Dept: URBAN - NONMETROPOLITAN AREA CLINIC 1 | Facility: CLINIC | Age: 75
Setting detail: OPHTHALMOLOGY
End: 2025-07-14
Payer: MEDICARE

## 2025-07-14 DIAGNOSIS — H35.372: ICD-10-CM

## 2025-07-14 DIAGNOSIS — H43.813: ICD-10-CM

## 2025-07-14 DIAGNOSIS — H43.393: ICD-10-CM

## 2025-07-14 DIAGNOSIS — H35.371: ICD-10-CM

## 2025-07-14 PROCEDURE — 99213 OFFICE O/P EST LOW 20 MIN: CPT | Performed by: OPHTHALMOLOGY

## 2025-07-14 PROCEDURE — 92134 CPTRZ OPH DX IMG PST SGM RTA: CPT | Performed by: OPHTHALMOLOGY

## 2025-07-14 ASSESSMENT — CONFRONTATIONAL VISUAL FIELD TEST (CVF)
OS_FINDINGS: FULL
OD_FINDINGS: FULL

## 2025-07-14 ASSESSMENT — REFRACTION_CURRENTRX
OS_OVR_VA: 20/
OD_OVR_VA: 20/
OS_OVR_VA: 20/
OD_SPHERE: +2.00
OD_OVR_VA: 20/
OS_SPHERE: +2.00

## 2025-07-14 ASSESSMENT — KERATOMETRY
OS_K1POWER_DIOPTERS: 40.75
OD_AXISANGLE_DEGREES: 158
OD_K1POWER_DIOPTERS: 41.75
OS_K2POWER_DIOPTERS: 41.25
OD_K2POWER_DIOPTERS: 42.25
OS_AXISANGLE_DEGREES: 086

## 2025-07-14 ASSESSMENT — CORNEAL DYSTROPHY
OS_DYSTROPHY: VERTICILLATA
OD_DYSTROPHY: VERTICILLATA

## 2025-07-14 ASSESSMENT — VISUAL ACUITY
OD_BCVA: 20/30+1
OS_BCVA: 20/25-2

## 2025-07-14 ASSESSMENT — REFRACTION_AUTOREFRACTION
OD_AXIS: 084
OD_CYLINDER: -0.75
OS_AXIS: 020
OS_SPHERE: +0.50
OD_SPHERE: +1.00
OS_CYLINDER: 0.00

## 2025-07-14 ASSESSMENT — DRY EYES - PHYSICIAN NOTES: OS_GENERALCOMMENTS: MILD KSICCA
